# Patient Record
Sex: MALE | URBAN - METROPOLITAN AREA
[De-identification: names, ages, dates, MRNs, and addresses within clinical notes are randomized per-mention and may not be internally consistent; named-entity substitution may affect disease eponyms.]

---

## 2019-09-24 ENCOUNTER — AMBULATORY SURGICAL CENTER (OUTPATIENT)
Dept: URBAN - METROPOLITAN AREA SURGERY 10 | Facility: SURGERY | Age: 64
End: 2019-09-24
Payer: MEDICARE

## 2019-09-24 ENCOUNTER — OFFICE (OUTPATIENT)
Dept: URBAN - METROPOLITAN AREA PATHOLOGY 4 | Facility: PATHOLOGY | Age: 64
End: 2019-09-24
Payer: MEDICARE

## 2019-09-24 DIAGNOSIS — D12.4 BENIGN NEOPLASM OF DESCENDING COLON: ICD-10-CM

## 2019-09-24 DIAGNOSIS — K64.0 FIRST DEGREE HEMORRHOIDS: ICD-10-CM

## 2019-09-24 DIAGNOSIS — K62.1 RECTAL POLYP: ICD-10-CM

## 2019-09-24 DIAGNOSIS — K57.90 DIVERTICULOSIS OF INTESTINE, PART UNSPECIFIED, WITHOUT PERFO: ICD-10-CM

## 2019-09-24 DIAGNOSIS — Z86.010 PERSONAL HISTORY OF COLONIC POLYPS: ICD-10-CM

## 2019-09-24 PROCEDURE — 45385 COLONOSCOPY W/LESION REMOVAL: CPT | Mod: PT | Performed by: INTERNAL MEDICINE

## 2019-09-24 PROCEDURE — 88305 TISSUE EXAM BY PATHOLOGIST: CPT | Performed by: INTERNAL MEDICINE

## 2019-09-25 PROBLEM — Z86.010 PERSONAL HISTORY OF COLON POLYPS: Status: ACTIVE | Noted: 2019-09-25

## 2020-12-30 ENCOUNTER — APPOINTMENT (OUTPATIENT)
Dept: PREADMISSION TESTING | Facility: HOSPITAL | Age: 65
End: 2020-12-30

## 2020-12-30 ENCOUNTER — HOSPITAL ENCOUNTER (OUTPATIENT)
Dept: GENERAL RADIOLOGY | Facility: HOSPITAL | Age: 65
Discharge: HOME | End: 2020-12-30

## 2020-12-30 VITALS — BODY MASS INDEX: 39.57 KG/M2 | HEIGHT: 66 IN | WEIGHT: 246.2 LBS

## 2020-12-30 LAB
ALBUMIN SERPL-MCNC: 3.9 G/DL (ref 3.5–5.2)
ALBUMIN/GLOB SERPL: 1.7 G/DL
ALP SERPL-CCNC: 84 U/L (ref 39–117)
ALT SERPL W P-5'-P-CCNC: 23 U/L (ref 1–41)
ANION GAP SERPL CALCULATED.3IONS-SCNC: 7 MMOL/L (ref 5–15)
APTT PPP: 31.4 SECONDS (ref 24–37)
AST SERPL-CCNC: 18 U/L (ref 1–40)
BASOPHILS # BLD AUTO: 0.03 10*3/MM3 (ref 0–0.2)
BASOPHILS NFR BLD AUTO: 0.5 % (ref 0–1.5)
BILIRUB SERPL-MCNC: 0.3 MG/DL (ref 0–1.2)
BILIRUB UR QL STRIP: NEGATIVE
BUN SERPL-MCNC: 18 MG/DL (ref 8–23)
BUN/CREAT SERPL: 19.1 (ref 7–25)
CALCIUM SPEC-SCNC: 8.7 MG/DL (ref 8.6–10.5)
CHLORIDE SERPL-SCNC: 103 MMOL/L (ref 98–107)
CLARITY UR: CLEAR
CO2 SERPL-SCNC: 29 MMOL/L (ref 22–29)
COLOR UR: YELLOW
CREAT SERPL-MCNC: 0.94 MG/DL (ref 0.76–1.27)
DEPRECATED RDW RBC AUTO: 46 FL (ref 37–54)
EOSINOPHIL # BLD AUTO: 0.08 10*3/MM3 (ref 0–0.4)
EOSINOPHIL NFR BLD AUTO: 1.2 % (ref 0.3–6.2)
ERYTHROCYTE [DISTWIDTH] IN BLOOD BY AUTOMATED COUNT: 12.5 % (ref 12.3–15.4)
GFR SERPL CREATININE-BSD FRML MDRD: 80 ML/MIN/1.73
GFR SERPL CREATININE-BSD FRML MDRD: 97 ML/MIN/1.73
GLOBULIN UR ELPH-MCNC: 2.3 GM/DL
GLUCOSE SERPL-MCNC: 87 MG/DL (ref 65–99)
GLUCOSE UR STRIP-MCNC: NEGATIVE MG/DL
HBA1C MFR BLD: 5.6 % (ref 4.8–5.6)
HCT VFR BLD AUTO: 44.4 % (ref 37.5–51)
HGB BLD-MCNC: 14.5 G/DL (ref 13–17.7)
HGB UR QL STRIP.AUTO: NEGATIVE
IMM GRANULOCYTES # BLD AUTO: 0.02 10*3/MM3 (ref 0–0.05)
IMM GRANULOCYTES NFR BLD AUTO: 0.3 % (ref 0–0.5)
INR PPP: 0.98 (ref 0.85–1.16)
KETONES UR QL STRIP: NEGATIVE
LEUKOCYTE ESTERASE UR QL STRIP.AUTO: NEGATIVE
LYMPHOCYTES # BLD AUTO: 2.08 10*3/MM3 (ref 0.7–3.1)
LYMPHOCYTES NFR BLD AUTO: 32.3 % (ref 19.6–45.3)
MCH RBC QN AUTO: 32.3 PG (ref 26.6–33)
MCHC RBC AUTO-ENTMCNC: 32.7 G/DL (ref 31.5–35.7)
MCV RBC AUTO: 98.9 FL (ref 79–97)
MONOCYTES # BLD AUTO: 0.63 10*3/MM3 (ref 0.1–0.9)
MONOCYTES NFR BLD AUTO: 9.8 % (ref 5–12)
NEUTROPHILS NFR BLD AUTO: 3.6 10*3/MM3 (ref 1.7–7)
NEUTROPHILS NFR BLD AUTO: 55.9 % (ref 42.7–76)
NITRITE UR QL STRIP: NEGATIVE
NRBC BLD AUTO-RTO: 0 /100 WBC (ref 0–0.2)
PH UR STRIP.AUTO: 5.5 [PH] (ref 5–8)
PLATELET # BLD AUTO: 256 10*3/MM3 (ref 140–450)
PMV BLD AUTO: 9.1 FL (ref 6–12)
POTASSIUM SERPL-SCNC: 4.2 MMOL/L (ref 3.5–5.2)
PROT SERPL-MCNC: 6.2 G/DL (ref 6–8.5)
PROT UR QL STRIP: NEGATIVE
PROTHROMBIN TIME: 12.7 SECONDS (ref 11.5–14)
QT INTERVAL: 396 MS
QTC INTERVAL: 398 MS
RBC # BLD AUTO: 4.49 10*6/MM3 (ref 4.14–5.8)
SODIUM SERPL-SCNC: 139 MMOL/L (ref 136–145)
SP GR UR STRIP: 1.02 (ref 1–1.03)
UROBILINOGEN UR QL STRIP: NORMAL
WBC # BLD AUTO: 6.44 10*3/MM3 (ref 3.4–10.8)

## 2020-12-30 PROCEDURE — 85025 COMPLETE CBC W/AUTO DIFF WBC: CPT

## 2020-12-30 PROCEDURE — 85730 THROMBOPLASTIN TIME PARTIAL: CPT

## 2020-12-30 PROCEDURE — 85610 PROTHROMBIN TIME: CPT

## 2020-12-30 PROCEDURE — 81003 URINALYSIS AUTO W/O SCOPE: CPT

## 2020-12-30 PROCEDURE — 80053 COMPREHEN METABOLIC PANEL: CPT

## 2020-12-30 PROCEDURE — 36415 COLL VENOUS BLD VENIPUNCTURE: CPT

## 2020-12-30 PROCEDURE — 71046 X-RAY EXAM CHEST 2 VIEWS: CPT

## 2020-12-30 PROCEDURE — 83036 HEMOGLOBIN GLYCOSYLATED A1C: CPT

## 2020-12-30 PROCEDURE — 93005 ELECTROCARDIOGRAM TRACING: CPT

## 2020-12-30 PROCEDURE — 93010 ELECTROCARDIOGRAM REPORT: CPT | Performed by: INTERNAL MEDICINE

## 2020-12-30 RX ORDER — CHOLECALCIFEROL (VITAMIN D3) 125 MCG
1 CAPSULE ORAL DAILY
COMMUNITY

## 2020-12-30 RX ORDER — OMEPRAZOLE 20 MG/1
20 CAPSULE, DELAYED RELEASE ORAL 2 TIMES DAILY
COMMUNITY

## 2020-12-30 RX ORDER — MULTIPLE VITAMINS W/ MINERALS TAB 9MG-400MCG
1 TAB ORAL DAILY
COMMUNITY

## 2020-12-30 RX ORDER — TAMSULOSIN HYDROCHLORIDE 0.4 MG/1
1 CAPSULE ORAL DAILY
COMMUNITY

## 2020-12-30 RX ORDER — LOSARTAN POTASSIUM 100 MG/1
100 TABLET ORAL DAILY
COMMUNITY

## 2020-12-30 RX ORDER — ACETAMINOPHEN 500 MG
1000 TABLET ORAL 2 TIMES DAILY PRN
COMMUNITY

## 2021-01-05 ENCOUNTER — APPOINTMENT (OUTPATIENT)
Dept: PREADMISSION TESTING | Facility: HOSPITAL | Age: 66
End: 2021-01-05

## 2021-01-06 ENCOUNTER — ANESTHESIA EVENT (OUTPATIENT)
Dept: PERIOP | Facility: HOSPITAL | Age: 66
End: 2021-01-06

## 2021-01-06 RX ORDER — FAMOTIDINE 10 MG/ML
20 INJECTION, SOLUTION INTRAVENOUS ONCE
Status: CANCELLED | OUTPATIENT
Start: 2021-01-06 | End: 2021-01-06

## 2021-01-07 ENCOUNTER — APPOINTMENT (OUTPATIENT)
Dept: GENERAL RADIOLOGY | Facility: HOSPITAL | Age: 66
End: 2021-01-07

## 2021-01-07 ENCOUNTER — ANESTHESIA EVENT CONVERTED (OUTPATIENT)
Dept: ANESTHESIOLOGY | Facility: HOSPITAL | Age: 66
End: 2021-01-07

## 2021-01-07 ENCOUNTER — ANESTHESIA (OUTPATIENT)
Dept: PERIOP | Facility: HOSPITAL | Age: 66
End: 2021-01-07

## 2021-01-07 ENCOUNTER — HOSPITAL ENCOUNTER (INPATIENT)
Facility: HOSPITAL | Age: 66
LOS: 1 days | Discharge: HOME | End: 2021-01-08
Attending: ORTHOPAEDIC SURGERY | Admitting: ORTHOPAEDIC SURGERY

## 2021-01-07 DIAGNOSIS — Z96.612 STATUS POST REVERSE TOTAL ARTHROPLASTY OF LEFT SHOULDER: Primary | ICD-10-CM

## 2021-01-07 PROBLEM — E66.9 OBESITY (BMI 30-39.9): Status: ACTIVE | Noted: 2021-01-07

## 2021-01-07 PROBLEM — G47.33 OBSTRUCTIVE SLEEP APNEA: Status: ACTIVE | Noted: 2021-01-07

## 2021-01-07 PROBLEM — I10 ESSENTIAL HYPERTENSION: Status: ACTIVE | Noted: 2021-01-07

## 2021-01-07 PROBLEM — M12.812 ROTATOR CUFF ARTHROPATHY OF LEFT SHOULDER: Status: ACTIVE | Noted: 2021-01-07

## 2021-01-07 PROCEDURE — 25010000002 VANCOMYCIN 1 G RECONSTITUTED SOLUTION: Performed by: ORTHOPAEDIC SURGERY

## 2021-01-07 PROCEDURE — C1776 JOINT DEVICE (IMPLANTABLE): HCPCS | Performed by: ORTHOPAEDIC SURGERY

## 2021-01-07 PROCEDURE — 25010000002 PROPOFOL 10 MG/ML EMULSION: Performed by: NURSE ANESTHETIST, CERTIFIED REGISTERED

## 2021-01-07 PROCEDURE — 97535 SELF CARE MNGMENT TRAINING: CPT

## 2021-01-07 PROCEDURE — 25010000002 CEFAZOLIN PER 500 MG: Performed by: ORTHOPAEDIC SURGERY

## 2021-01-07 PROCEDURE — 25010000002 DEXAMETHASONE PER 1 MG: Performed by: NURSE ANESTHETIST, CERTIFIED REGISTERED

## 2021-01-07 PROCEDURE — 25010000003 CEFAZOLIN IN DEXTROSE 2-4 GM/100ML-% SOLUTION: Performed by: ORTHOPAEDIC SURGERY

## 2021-01-07 PROCEDURE — 97165 OT EVAL LOW COMPLEX 30 MIN: CPT

## 2021-01-07 PROCEDURE — 94660 CPAP INITIATION&MGMT: CPT

## 2021-01-07 PROCEDURE — 0RRK00Z REPLACEMENT OF LEFT SHOULDER JOINT WITH REVERSE BALL AND SOCKET SYNTHETIC SUBSTITUTE, OPEN APPROACH: ICD-10-PCS | Performed by: ORTHOPAEDIC SURGERY

## 2021-01-07 PROCEDURE — 25010000002 ONDANSETRON PER 1 MG: Performed by: NURSE ANESTHETIST, CERTIFIED REGISTERED

## 2021-01-07 PROCEDURE — 25010000002 FENTANYL CITRATE (PF) 100 MCG/2ML SOLUTION: Performed by: NURSE ANESTHETIST, CERTIFIED REGISTERED

## 2021-01-07 PROCEDURE — 25010000002 NEOSTIGMINE 10 MG/10ML SOLUTION: Performed by: NURSE ANESTHETIST, CERTIFIED REGISTERED

## 2021-01-07 PROCEDURE — 73020 X-RAY EXAM OF SHOULDER: CPT

## 2021-01-07 PROCEDURE — L3670 SO ACRO/CLAV CAN WEB PRE OTS: HCPCS | Performed by: ORTHOPAEDIC SURGERY

## 2021-01-07 PROCEDURE — 25010000002 DEXAMETHASONE SODIUM PHOSPHATE 10 MG/ML SOLUTION: Performed by: ANESTHESIOLOGY

## 2021-01-07 PROCEDURE — 94799 UNLISTED PULMONARY SVC/PX: CPT

## 2021-01-07 PROCEDURE — 97110 THERAPEUTIC EXERCISES: CPT

## 2021-01-07 PROCEDURE — 76942 ECHO GUIDE FOR BIOPSY: CPT | Performed by: ORTHOPAEDIC SURGERY

## 2021-01-07 PROCEDURE — 25010000002 ROPIVACAINE PER 1 MG: Performed by: ORTHOPAEDIC SURGERY

## 2021-01-07 DEVICE — 5.5X24MM PERIPHERAL SCREW, LOCKING
Type: IMPLANTABLE DEVICE | Site: SHOULDER | Status: FUNCTIONAL
Brand: ARTHREX®

## 2021-01-07 DEVICE — SUT FW #2 W/TPR NDL 1/2 CIR 38IN 97CM 26.5MM BLU: Type: IMPLANTABLE DEVICE | Site: SHOULDER | Status: FUNCTIONAL

## 2021-01-07 DEVICE — HUMERAL INSERT M/39 +6 TO FIT IN 39 CUP
Type: IMPLANTABLE DEVICE | Site: SHOULDER | Status: FUNCTIONAL
Brand: ARTHREX®

## 2021-01-07 DEVICE — 20MM CENTRAL SCREW, MODULAR
Type: IMPLANTABLE DEVICE | Site: SHOULDER | Status: FUNCTIONAL
Brand: ARTHREX®

## 2021-01-07 DEVICE — IMPLANTABLE DEVICE: Type: IMPLANTABLE DEVICE | Site: SHOULDER | Status: FUNCTIONAL

## 2021-01-07 DEVICE — 4.5X32MM PERIPHERAL SCREW, NON-LOCKING
Type: IMPLANTABLE DEVICE | Site: SHOULDER | Status: FUNCTIONAL
Brand: ARTHREX®

## 2021-01-07 DEVICE — 24MM +2 LAT BASEPLATE, MODULAR
Type: IMPLANTABLE DEVICE | Site: SHOULDER | Status: FUNCTIONAL
Brand: ARTHREX®

## 2021-01-07 DEVICE — ABSORBABLE HEMOSTAT (OXIDIZED REGENERATED CELLULOSE, U.S.P.)
Type: IMPLANTABLE DEVICE | Site: SHOULDER | Status: FUNCTIONAL
Brand: SURGICEL

## 2021-01-07 DEVICE — 5.5X32MM PERIPHERAL SCREW, LOCKING
Type: IMPLANTABLE DEVICE | Site: SHOULDER | Status: FUNCTIONAL
Brand: ARTHREX®

## 2021-01-07 DEVICE — UNIVERS REVERS APEX STEM, SIZE 9
Type: IMPLANTABLE DEVICE | Site: SHOULDER | Status: FUNCTIONAL
Brand: ARTHREX®

## 2021-01-07 DEVICE — UNIVERS REVERS SUTURE CUP, 39 (+2 LEFT)
Type: IMPLANTABLE DEVICE | Site: SHOULDER | Status: FUNCTIONAL
Brand: ARTHREX®

## 2021-01-07 DEVICE — 39 +4 LAT/24 GLENOSPHERE
Type: IMPLANTABLE DEVICE | Site: SHOULDER | Status: FUNCTIONAL
Brand: ARTHREX®

## 2021-01-07 DEVICE — 4.5X36MM PERIPHERAL SCREW, NON-LOCKING
Type: IMPLANTABLE DEVICE | Site: SHOULDER | Status: FUNCTIONAL
Brand: ARTHREX®

## 2021-01-07 RX ORDER — NEOSTIGMINE METHYLSULFATE 1 MG/ML
INJECTION, SOLUTION INTRAVENOUS AS NEEDED
Status: DISCONTINUED | OUTPATIENT
Start: 2021-01-07 | End: 2021-01-07 | Stop reason: SURG

## 2021-01-07 RX ORDER — HYDROMORPHONE HYDROCHLORIDE 1 MG/ML
0.5 INJECTION, SOLUTION INTRAMUSCULAR; INTRAVENOUS; SUBCUTANEOUS
Status: DISCONTINUED | OUTPATIENT
Start: 2021-01-07 | End: 2021-01-08 | Stop reason: HOSPADM

## 2021-01-07 RX ORDER — PREGABALIN 75 MG/1
75 CAPSULE ORAL ONCE
Status: COMPLETED | OUTPATIENT
Start: 2021-01-07 | End: 2021-01-07

## 2021-01-07 RX ORDER — SODIUM CHLORIDE 0.9 % (FLUSH) 0.9 %
10 SYRINGE (ML) INJECTION EVERY 12 HOURS SCHEDULED
Status: DISCONTINUED | OUTPATIENT
Start: 2021-01-07 | End: 2021-01-07 | Stop reason: HOSPADM

## 2021-01-07 RX ORDER — PANTOPRAZOLE SODIUM 40 MG/1
40 TABLET, DELAYED RELEASE ORAL
Status: DISCONTINUED | OUTPATIENT
Start: 2021-01-08 | End: 2021-01-08 | Stop reason: HOSPADM

## 2021-01-07 RX ORDER — DEXAMETHASONE SODIUM PHOSPHATE 10 MG/ML
INJECTION, SOLUTION INTRAMUSCULAR; INTRAVENOUS
Status: COMPLETED | OUTPATIENT
Start: 2021-01-07 | End: 2021-01-07

## 2021-01-07 RX ORDER — GLYCOPYRROLATE 0.2 MG/ML
INJECTION INTRAMUSCULAR; INTRAVENOUS AS NEEDED
Status: DISCONTINUED | OUTPATIENT
Start: 2021-01-07 | End: 2021-01-07 | Stop reason: SURG

## 2021-01-07 RX ORDER — ACETAMINOPHEN 650 MG
TABLET, EXTENDED RELEASE ORAL AS NEEDED
Status: DISCONTINUED | OUTPATIENT
Start: 2021-01-07 | End: 2021-01-07 | Stop reason: HOSPADM

## 2021-01-07 RX ORDER — ONDANSETRON 2 MG/ML
4 INJECTION INTRAMUSCULAR; INTRAVENOUS EVERY 6 HOURS PRN
Status: DISCONTINUED | OUTPATIENT
Start: 2021-01-07 | End: 2021-01-08 | Stop reason: HOSPADM

## 2021-01-07 RX ORDER — ONDANSETRON 2 MG/ML
4 INJECTION INTRAMUSCULAR; INTRAVENOUS ONCE AS NEEDED
Status: DISCONTINUED | OUTPATIENT
Start: 2021-01-07 | End: 2021-01-07 | Stop reason: HOSPADM

## 2021-01-07 RX ORDER — SODIUM CHLORIDE 0.9 % (FLUSH) 0.9 %
10 SYRINGE (ML) INJECTION AS NEEDED
Status: DISCONTINUED | OUTPATIENT
Start: 2021-01-07 | End: 2021-01-07 | Stop reason: HOSPADM

## 2021-01-07 RX ORDER — VANCOMYCIN HYDROCHLORIDE 1 G/20ML
INJECTION, POWDER, LYOPHILIZED, FOR SOLUTION INTRAVENOUS AS NEEDED
Status: DISCONTINUED | OUTPATIENT
Start: 2021-01-07 | End: 2021-01-07 | Stop reason: HOSPADM

## 2021-01-07 RX ORDER — FENTANYL CITRATE 50 UG/ML
50 INJECTION, SOLUTION INTRAMUSCULAR; INTRAVENOUS
Status: DISCONTINUED | OUTPATIENT
Start: 2021-01-07 | End: 2021-01-07 | Stop reason: HOSPADM

## 2021-01-07 RX ORDER — TAMSULOSIN HYDROCHLORIDE 0.4 MG/1
0.4 CAPSULE ORAL NIGHTLY
Status: DISCONTINUED | OUTPATIENT
Start: 2021-01-07 | End: 2021-01-08 | Stop reason: HOSPADM

## 2021-01-07 RX ORDER — ACETAMINOPHEN 500 MG
1000 TABLET ORAL ONCE
Status: COMPLETED | OUTPATIENT
Start: 2021-01-07 | End: 2021-01-07

## 2021-01-07 RX ORDER — EPHEDRINE SULFATE 50 MG/ML
5 INJECTION, SOLUTION INTRAVENOUS ONCE AS NEEDED
Status: DISCONTINUED | OUTPATIENT
Start: 2021-01-07 | End: 2021-01-07 | Stop reason: HOSPADM

## 2021-01-07 RX ORDER — BUPIVACAINE HYDROCHLORIDE 2.5 MG/ML
INJECTION, SOLUTION EPIDURAL; INFILTRATION; INTRACAUDAL
Status: COMPLETED | OUTPATIENT
Start: 2021-01-07 | End: 2021-01-07

## 2021-01-07 RX ORDER — ONDANSETRON 4 MG/1
4 TABLET, FILM COATED ORAL EVERY 6 HOURS PRN
Status: DISCONTINUED | OUTPATIENT
Start: 2021-01-07 | End: 2021-01-08 | Stop reason: HOSPADM

## 2021-01-07 RX ORDER — LIDOCAINE HYDROCHLORIDE 10 MG/ML
0.5 INJECTION, SOLUTION EPIDURAL; INFILTRATION; INTRACAUDAL; PERINEURAL ONCE AS NEEDED
Status: COMPLETED | OUTPATIENT
Start: 2021-01-07 | End: 2021-01-07

## 2021-01-07 RX ORDER — SODIUM CHLORIDE, SODIUM LACTATE, POTASSIUM CHLORIDE, CALCIUM CHLORIDE 600; 310; 30; 20 MG/100ML; MG/100ML; MG/100ML; MG/100ML
100 INJECTION, SOLUTION INTRAVENOUS CONTINUOUS
Status: DISCONTINUED | OUTPATIENT
Start: 2021-01-07 | End: 2021-01-08 | Stop reason: HOSPADM

## 2021-01-07 RX ORDER — FAMOTIDINE 20 MG/1
20 TABLET, FILM COATED ORAL ONCE
Status: COMPLETED | OUTPATIENT
Start: 2021-01-07 | End: 2021-01-07

## 2021-01-07 RX ORDER — HYDROMORPHONE HYDROCHLORIDE 1 MG/ML
0.5 INJECTION, SOLUTION INTRAMUSCULAR; INTRAVENOUS; SUBCUTANEOUS
Status: DISCONTINUED | OUTPATIENT
Start: 2021-01-07 | End: 2021-01-07 | Stop reason: HOSPADM

## 2021-01-07 RX ORDER — LABETALOL HYDROCHLORIDE 5 MG/ML
10 INJECTION, SOLUTION INTRAVENOUS EVERY 4 HOURS PRN
Status: DISCONTINUED | OUTPATIENT
Start: 2021-01-07 | End: 2021-01-08 | Stop reason: HOSPADM

## 2021-01-07 RX ORDER — ACETAMINOPHEN 650 MG/1
650 SUPPOSITORY RECTAL EVERY 4 HOURS PRN
Status: DISCONTINUED | OUTPATIENT
Start: 2021-01-07 | End: 2021-01-08 | Stop reason: HOSPADM

## 2021-01-07 RX ORDER — MAGNESIUM HYDROXIDE 1200 MG/15ML
LIQUID ORAL AS NEEDED
Status: DISCONTINUED | OUTPATIENT
Start: 2021-01-07 | End: 2021-01-07 | Stop reason: HOSPADM

## 2021-01-07 RX ORDER — LIDOCAINE HYDROCHLORIDE 10 MG/ML
INJECTION, SOLUTION EPIDURAL; INFILTRATION; INTRACAUDAL; PERINEURAL AS NEEDED
Status: DISCONTINUED | OUTPATIENT
Start: 2021-01-07 | End: 2021-01-07 | Stop reason: SURG

## 2021-01-07 RX ORDER — OXYCODONE HYDROCHLORIDE 5 MG/1
10 TABLET ORAL EVERY 4 HOURS PRN
Status: DISCONTINUED | OUTPATIENT
Start: 2021-01-07 | End: 2021-01-08 | Stop reason: HOSPADM

## 2021-01-07 RX ORDER — SODIUM CHLORIDE, SODIUM LACTATE, POTASSIUM CHLORIDE, CALCIUM CHLORIDE 600; 310; 30; 20 MG/100ML; MG/100ML; MG/100ML; MG/100ML
9 INJECTION, SOLUTION INTRAVENOUS CONTINUOUS
Status: DISCONTINUED | OUTPATIENT
Start: 2021-01-07 | End: 2021-01-07

## 2021-01-07 RX ORDER — NALOXONE HCL 0.4 MG/ML
0.1 VIAL (ML) INJECTION
Status: DISCONTINUED | OUTPATIENT
Start: 2021-01-07 | End: 2021-01-08 | Stop reason: HOSPADM

## 2021-01-07 RX ORDER — CEFAZOLIN SODIUM 2 G/100ML
2 INJECTION, SOLUTION INTRAVENOUS EVERY 8 HOURS
Status: COMPLETED | OUTPATIENT
Start: 2021-01-07 | End: 2021-01-08

## 2021-01-07 RX ORDER — ONDANSETRON 2 MG/ML
INJECTION INTRAMUSCULAR; INTRAVENOUS AS NEEDED
Status: DISCONTINUED | OUTPATIENT
Start: 2021-01-07 | End: 2021-01-07 | Stop reason: SURG

## 2021-01-07 RX ORDER — SODIUM CHLORIDE 450 MG/100ML
50 INJECTION, SOLUTION INTRAVENOUS CONTINUOUS
Status: DISCONTINUED | OUTPATIENT
Start: 2021-01-07 | End: 2021-01-08 | Stop reason: HOSPADM

## 2021-01-07 RX ORDER — ROCURONIUM BROMIDE 10 MG/ML
INJECTION, SOLUTION INTRAVENOUS AS NEEDED
Status: DISCONTINUED | OUTPATIENT
Start: 2021-01-07 | End: 2021-01-07 | Stop reason: SURG

## 2021-01-07 RX ORDER — FENTANYL CITRATE 50 UG/ML
INJECTION, SOLUTION INTRAMUSCULAR; INTRAVENOUS
Status: COMPLETED | OUTPATIENT
Start: 2021-01-07 | End: 2021-01-07

## 2021-01-07 RX ORDER — OXYCODONE HYDROCHLORIDE 5 MG/1
5 TABLET ORAL EVERY 4 HOURS PRN
Status: DISCONTINUED | OUTPATIENT
Start: 2021-01-07 | End: 2021-01-08 | Stop reason: HOSPADM

## 2021-01-07 RX ORDER — CEFAZOLIN SODIUM 2 G/100ML
2 INJECTION, SOLUTION INTRAVENOUS ONCE
Status: COMPLETED | OUTPATIENT
Start: 2021-01-07 | End: 2021-01-07

## 2021-01-07 RX ORDER — PROPOFOL 10 MG/ML
VIAL (ML) INTRAVENOUS AS NEEDED
Status: DISCONTINUED | OUTPATIENT
Start: 2021-01-07 | End: 2021-01-07 | Stop reason: SURG

## 2021-01-07 RX ORDER — NALOXONE HCL 0.4 MG/ML
0.4 VIAL (ML) INJECTION AS NEEDED
Status: DISCONTINUED | OUTPATIENT
Start: 2021-01-07 | End: 2021-01-07 | Stop reason: HOSPADM

## 2021-01-07 RX ORDER — ACETAMINOPHEN 325 MG/1
650 TABLET ORAL EVERY 4 HOURS PRN
Status: DISCONTINUED | OUTPATIENT
Start: 2021-01-07 | End: 2021-01-08 | Stop reason: HOSPADM

## 2021-01-07 RX ORDER — DEXAMETHASONE SODIUM PHOSPHATE 4 MG/ML
INJECTION, SOLUTION INTRA-ARTICULAR; INTRALESIONAL; INTRAMUSCULAR; INTRAVENOUS; SOFT TISSUE AS NEEDED
Status: DISCONTINUED | OUTPATIENT
Start: 2021-01-07 | End: 2021-01-07 | Stop reason: SURG

## 2021-01-07 RX ADMIN — LIDOCAINE HYDROCHLORIDE 0.2 ML: 10 INJECTION, SOLUTION EPIDURAL; INFILTRATION; INTRACAUDAL; PERINEURAL at 06:06

## 2021-01-07 RX ADMIN — TAMSULOSIN HYDROCHLORIDE 0.4 MG: 0.4 CAPSULE ORAL at 20:01

## 2021-01-07 RX ADMIN — OXYCODONE 5 MG: 5 TABLET ORAL at 12:17

## 2021-01-07 RX ADMIN — Medication 1000 MG: at 07:39

## 2021-01-07 RX ADMIN — SODIUM CHLORIDE, POTASSIUM CHLORIDE, SODIUM LACTATE AND CALCIUM CHLORIDE: 600; 310; 30; 20 INJECTION, SOLUTION INTRAVENOUS at 09:06

## 2021-01-07 RX ADMIN — EPHEDRINE SULFATE 10 MG: 50 INJECTION INTRAMUSCULAR; INTRAVENOUS; SUBCUTANEOUS at 07:54

## 2021-01-07 RX ADMIN — ONDANSETRON 4 MG: 2 INJECTION INTRAMUSCULAR; INTRAVENOUS at 09:10

## 2021-01-07 RX ADMIN — BUPIVACAINE HYDROCHLORIDE 15 ML: 2.5 INJECTION, SOLUTION EPIDURAL; INFILTRATION; INTRACAUDAL; PERINEURAL at 07:11

## 2021-01-07 RX ADMIN — ACETAMINOPHEN 1000 MG: 500 TABLET ORAL at 06:20

## 2021-01-07 RX ADMIN — CEFAZOLIN SODIUM 2 G: 2 INJECTION, SOLUTION INTRAVENOUS at 07:30

## 2021-01-07 RX ADMIN — ROCURONIUM BROMIDE 50 MG: 10 INJECTION INTRAVENOUS at 07:34

## 2021-01-07 RX ADMIN — CEFAZOLIN 2 G: 10 INJECTION, POWDER, FOR SOLUTION INTRAVENOUS at 23:30

## 2021-01-07 RX ADMIN — CEFAZOLIN 2 G: 10 INJECTION, POWDER, FOR SOLUTION INTRAVENOUS at 15:23

## 2021-01-07 RX ADMIN — BUPIVACAINE HYDROCHLORIDE 30 ML: 2.5 INJECTION, SOLUTION EPIDURAL; INFILTRATION; INTRACAUDAL; PERINEURAL at 07:35

## 2021-01-07 RX ADMIN — PREGABALIN 75 MG: 75 CAPSULE ORAL at 06:20

## 2021-01-07 RX ADMIN — EPHEDRINE SULFATE 10 MG: 50 INJECTION INTRAMUSCULAR; INTRAVENOUS; SUBCUTANEOUS at 08:29

## 2021-01-07 RX ADMIN — SODIUM CHLORIDE 50 ML/HR: 4.5 INJECTION, SOLUTION INTRAVENOUS at 12:17

## 2021-01-07 RX ADMIN — FAMOTIDINE 20 MG: 20 TABLET, FILM COATED ORAL at 06:20

## 2021-01-07 RX ADMIN — PROPOFOL 25 MCG/KG/MIN: 10 INJECTION, EMULSION INTRAVENOUS at 07:39

## 2021-01-07 RX ADMIN — SODIUM CHLORIDE, POTASSIUM CHLORIDE, SODIUM LACTATE AND CALCIUM CHLORIDE 9 ML/HR: 600; 310; 30; 20 INJECTION, SOLUTION INTRAVENOUS at 06:06

## 2021-01-07 RX ADMIN — NEOSTIGMINE 3 MG: 1 INJECTION INTRAVENOUS at 09:10

## 2021-01-07 RX ADMIN — LIDOCAINE HYDROCHLORIDE 50 MG: 10 INJECTION, SOLUTION EPIDURAL; INFILTRATION; INTRACAUDAL; PERINEURAL at 07:34

## 2021-01-07 RX ADMIN — DEXAMETHASONE SODIUM PHOSPHATE 2 MG: 10 INJECTION INTRAMUSCULAR; INTRAVENOUS at 07:35

## 2021-01-07 RX ADMIN — GLYCOPYRROLATE 0.4 MG: 0.2 INJECTION INTRAMUSCULAR; INTRAVENOUS at 09:10

## 2021-01-07 RX ADMIN — FENTANYL CITRATE 100 MCG: 50 INJECTION, SOLUTION INTRAMUSCULAR; INTRAVENOUS at 07:11

## 2021-01-07 RX ADMIN — PROPOFOL 200 MG: 10 INJECTION, EMULSION INTRAVENOUS at 07:34

## 2021-01-07 RX ADMIN — Medication 1000 MG: at 08:53

## 2021-01-07 RX ADMIN — DEXAMETHASONE SODIUM PHOSPHATE 8 MG: 4 INJECTION, SOLUTION INTRAMUSCULAR; INTRAVENOUS at 07:34

## 2021-01-07 NOTE — OP NOTE
Operative Report Reverse Total Shoulder Arthroplasty    DATE OF OPERATION: 01/07/21    PREOPERATIVE DIAGNOSIS: left shoulder massive irreparable rotator cuff tear      POSTOPERATIVE DIAGNOSES:  same      PROCEDURES PERFORMED:  1. left reverse total shoulder arthroplasty.         SURGEON: Franco Senior MD    ASSISTANTS:  1. Elizabeth Duran PA-C    ** Please note the physician assistant was medically necessary to assist with positioning retraction, arm positioning, care of soft tissues and closure      ANESTHESIA: General plus block.      ESTIMATED BLOOD LOSS:150mL.      COMPLICATIONS: None.      DISPOSITION: Recovery room in stable condition.      IMPLANTS:  Arthrex reverse total shoulder system  Humeral Stem: size 9  Humeral Tray: offset 39 at 135 degrees  Polyethylene Liner: 39+6 std  Baseplate: 24 MGS +2, 20mm central post screw  Glenosphere: 39+4     INDICATIONS: This is a 65-year-old male with left shoulder pain and limited function and motion secondary to above diagnosis. They have failed conservative treatment and after a discussion of risks, benefits, and alternatives, wished to proceed with shoulder arthroplasty.    DESCRIPTION OF PROCEDURE: On the day of surgery, the patient identified the left shoulder as the correct operative extremity. This was initialed by the surgeon with the patients's acknowledgment. The patient underwent placement of an interscalene block and was taken to the operating room and placed in the supine position. Upon induction of adequate anesthesia, the patient was brought up to the beach chair position and the shoulder and upper extremity were prepped and draped in the usual sterile fashion. Timeout confirmed the correct patient and operative extremity as well as that antibiotics were on board. A standard deltopectoral approach to the shoulder was carried out. It was carried sharply through the skin and subcutaneous tissue. Medial and lateral flaps were developed over the  deltopectoral fascia. The cephalic vein was identified and mobilized laterally with the deltoid. The subdeltoid and subpectoral spaces were mobilized and a blunt retractor was placed deep to this. The clavipectoral fascia was opened on the lateral edge of the conjoined tendon and the retractor was moved deep to this. The leading edge of the pectoralis was released exposing the long head of the biceps. This was tenosynovitic and therefore tenotomized. The 3 sisters were identified and coagulated. A subscapularis tenotomy was performed and rotator interval was released to the glenoid exposing the humeral head. The inferior capsule was released directly off the humerus to allow greater than 90° of external rotation. The anatomic neck was exposed and the humeral head osteotomy was performed in approximately 20° of retroversion. The remainder of the osteophytes were removed. The canal was then entered, reamed, and broached. The final stem impacted in in approximately 20° of retroversion. A head protector was placed. The humerus was subluxed posteriorly. The glenoid exposed. Circumferential labral excision and capsular release were performed. A  mobilization of the subscapularis was carried out as well.  A centering hole was drilled. The glenoid was gently reamed and then the  central hole for the baseplate was drilled  glenoid baseplate inserted.  Screws were then placed through the baseplate  The glenosphere was then inserted and locked into place with a set screw.  The humerus was carefully subluxed back anteriorly. A liner tray and polyethylene were placed and trialing was carried out. The appropriate final sizes were chosen and locked into place.  The shoulder was then reduced.  This allowed nearly full passive range of motion with no instability. The joint was copiously irrigated with orthopedic irrigation mixed with Betadine after the final implants were assembled and locked into place.     vancomycin powder was  placed in the wound      The deltopectoral interval was approximated with 0 Vicryl, the subcutaneous tissue with 2-0 Vicryl, and the skin with staples. A sterile dressing was placed. Anesthesia was reversed and the patient was taken to the recovery room in stable condition. All instrument, needle, and sponge counts were correct.      Franco Senior MD, MS

## 2021-01-07 NOTE — ANESTHESIA PROCEDURE NOTES
Peripheral Block      Patient reassessed immediately prior to procedure    Patient location during procedure: OR  Reason for block: at surgeon's request and post-op pain management  Performed by  CRNA: Lobito Adams CRNA  Preanesthetic Checklist  Completed: patient identified, site marked, surgical consent, pre-op evaluation, timeout performed, IV checked, risks and benefits discussed and monitors and equipment checked  Prep:  Pt Position: supine  Sterile barriers:cap, gloves, gown and mask  Prep: ChloraPrep  Patient monitoring: blood pressure monitoring, continuous pulse oximetry and EKG  Procedure  Performed under: general  Guidance:ultrasound guided and landmark technique  Images:still images obtained, printed/placed on chart    Laterality:left  Block Type:PECS I and PECS II  Injection Technique:single-shot  Needle Type:short-bevel  Needle Gauge:20 G  Resistance on Injection: none    Medications Used: dexamethasone sodium phosphate injection, 2 mg  bupivacaine PF (MARCAINE) 0.25 % injection, 30 mL  Med admintered at 1/7/2021 7:35 AM      Medications  Preservative Free Saline:10ml  Comment:Block Injection:  Total volume of LA divided between Right and Left sided blocks         Post Assessment  Injection Assessment: negative aspiration for heme and incremental injection  Patient Tolerance:comfortable throughout block  Complications:no  Additional Notes  The pt. Was placed in the Supine Position and GA was induced     The insertion site was prepped with CHG and Ultrasound guidance with In-Plane techniquewas  a 4inch BBraun 360 degree echogenic needle was visualized.  Normal Saline PSF was  utilized for hydrodissection of tissue. PECS 1 Block- Pectoralis Major and Minor where identified and LA was injected between PMM and PmM at the level of the 3rd Rib(10ml),  PECS 2-  Pectoralis Minor and Serratus muscle where identified and the needle was advanced laterally in-plane with the 4th rib as a backstop, pleura was  monitored.  LA was injected between SA and PmM at the level of 4th rib( 20ml).  LA injection spread was visualized, injection was incremental 1-5ml, normal or low injection pressure, no intravascular injection, no pneumothorax appreciated.  Thank You.

## 2021-01-07 NOTE — THERAPY EVALUATION
Patient Name: Stanley Bethea  : 1955    MRN: [id number]                              Today's Date: 2021       Admit Date: 2021    Visit Dx: No diagnosis found.  Patient Active Problem List   Diagnosis   • Rotator cuff arthropathy of left shoulder   • Obstructive sleep apnea   • Obesity (BMI 30-39.9)   • Essential hypertension   • Status post reverse total arthroplasty of left shoulder     Past Medical History:   Diagnosis Date   • Arthritis    • Heart murmur     from childhood   • History of head injury     6 years ago   • Kidney stone    • Sleep apnea      Past Surgical History:   Procedure Laterality Date   • COLONOSCOPY     • ENDOSCOPY     • GASTRECTOMY     • HERNIA REPAIR      inguinal, abdominal hernia   • JOINT REPLACEMENT      total hip on right side   • NASAL SINUS SURGERY     • ROTATOR CUFF REPAIR Bilateral    • SHOULDER ARTHROSCOPY       General Information     Row Name 21 1532          OT Time and Intention    Document Type  evaluation  -HK     Mode of Treatment  occupational therapy  -HK     Row Name 21 1532          General Information    Patient Profile Reviewed  yes  -HK     Prior Level of Function  min assist:;all household mobility;community mobility;gait;transfer;bed mobility;ADL's  -HK     Existing Precautions/Restrictions  fall;non-weight bearing;left;shoulder;other (see comments) NWB L UE; L UE in sling: L interscalene nerve catheter  -HK     Barriers to Rehab  none identified  -     Row Name 21 1532          Living Environment    Lives With  spouse  -HK     Row Name 21 1532          Home Main Entrance    Number of Stairs, Main Entrance  three  -HK     Stair Railings, Main Entrance  railings on both sides of stairs  -HK     Row Name 21 1532          Stairs Within Home, Primary    Number of Stairs, Within Home, Primary  none  -HK     Stair Railings, Within Home, Primary  none  -HK     Stairs Comment, Within Home, Primary  pt has tub shower. Educated on  completion of safe tub transfers.  -     Row Name 01/07/21 1532          Cognition    Orientation Status (Cognition)  oriented x 4  -HK     Row Name 01/07/21 1532          Safety Issues, Functional Mobility    Safety Issues Affecting Function (Mobility)  safety precautions follow-through/compliance;safety precaution awareness  -     Impairments Affecting Function (Mobility)  pain;strength;range of motion (ROM)  -       User Key  (r) = Recorded By, (t) = Taken By, (c) = Cosigned By    Initials Name Provider Type    HK Consuelo Whyte OT Occupational Therapist          Mobility/ADL's     Row Name 01/07/21 1533          Bed Mobility    Bed Mobility  scooting/bridging;supine-sit  -HK     Scooting/Bridging [place] (Bed Mobility)  supervision;verbal cues  -     Supine-Sit [place] (Bed Mobility)  minimum assist (75% patient effort);verbal cues  -     Bed Mobility, Safety Issues  decreased use of arms for pushing/pulling  -     Assistive Device (Bed Mobility)  head of bed elevated;bed rails  -     Comment (Bed Mobility)  Pt educated on safe completion of bed mobility while maintaining shoulder precautions.  -     Row Name 01/07/21 1533          Transfers    Transfers  sit-stand transfer  -     Sit-Stand [place] (Transfers)  contact guard  -     Row Name 01/07/21 1533          Sit-Stand Transfer    Assistive Device (Sit-Stand Transfers)  other (see comments) gait belt  -     Row Name 01/07/21 1533          Functional Mobility    Functional Mobility- Ind. Level  verbal cues required;contact guard assist  -     Functional Mobility- Device  -- gait belt  -     Functional Mobility-Distance (Feet)  300  -HK     Functional Mobility-Maintain WBing  able to maintain weight bearing status  -HK     Functional Mobility- Comment  Pt ambulated 300 feet with CGA and no AD. Pt with no noted LOB and O2 saturation >87 throughout. Saturations improved with cues for PLB. Pt passed mobility screen with  score of 22 and OT to d/c PT order.  -     Row Name 01/07/21 1533          Activities of Daily Living    BADL Assessment/Intervention  bathing;upper body dressing  -     Row Name 01/07/21 1533          Mobility    Extremity Weight-bearing Status  left upper extremity  -HK     Left Upper Extremity (Weight-bearing Status)  (S) non weight-bearing (NWB)  -     Row Name 01/07/21 1533          Bathing Assessment/Intervention    Comment (Bathing)  Pt educated on completion of L axilla care while maintaining shoulder precautions.  -     Row Name 01/07/21 1533          Upper Body Dressing Assessment/Training    [place] Level (Upper Body Dressing)  maximum assist (25% patient effort);other (see comments) sling  -HK     Position (Upper Body Dressing)  unsupported sitting  -HK     Comment (Upper Body Dressing)  Pt and wife educated on sling management as well as wear and care, shoulder precautions, axilla care, morteza dressing, and care of nerve catheter during ADLS. Wife to complete teach back on sling management tomorrow. Pt maxA at this time.  -       User Key  (r) = Recorded By, (t) = Taken By, (c) = Cosigned By    Initials Name Provider Type     Consuelo Whyte, OT Occupational Therapist        Obj/Interventions     Row Name 01/07/21 1538          Sensory Assessment (Somatosensory)    Sensory Assessment (Somatosensory)  left UE  -HK     Left UE Sensory Assessment  general sensation  -     Sensory Subjective Reports  insensate  -     Row Name 01/07/21 1538          Vision Assessment/Intervention    Visual Impairment/Limitations  WFL  -     Row Name 01/07/21 1538          Range of Motion Comprehensive    General Range of Motion  no range of motion deficits identified  -     Comment, General Range of Motion  R UE WFL ; L UE in sling  -     Row Name 01/07/21 1538          Strength Comprehensive (MMT)    Comment, General Manual Muscle Testing (MMT) Assessment  R UE WFL; L UE in sling  -     Row Name  01/07/21 1538          Elbow/Forearm (Therapeutic Exercise)    Elbow/Forearm (Therapeutic Exercise)  AAROM (active assistive range of motion)  -     Elbow/Forearm AAROM (Therapeutic Exercise)  left;flexion;extension;supination;pronation;10 repetitions;sitting  -     Row Name 01/07/21 1538          Wrist (Therapeutic Exercise)    Wrist (Therapeutic Exercise)  AROM (active range of motion)  -     Wrist AROM (Therapeutic Exercise)  left;flexion;extension;10 repetitions  -     Row Name 01/07/21 1538          Hand (Therapeutic Exercise)    Hand (Therapeutic Exercise)  AROM (active range of motion)  -     Hand AROM/AAROM (Therapeutic Exercise)  left;AAROM (active assistive range of motion);finger extension  -     Row Name 01/07/21 1538          Balance    Balance Assessment  sitting static balance;sitting dynamic balance;standing static balance  -     Static Sitting Balance  WNL;unsupported;sitting in chair  -     Dynamic Sitting Balance  WNL;unsupported;sitting in chair  -     Static Standing Balance  WNL;unsupported;standing  -     Row Name 01/07/21 1538          Therapeutic Exercise    Therapeutic Exercise  elbow/forearm;wrist;hand  -       User Key  (r) = Recorded By, (t) = Taken By, (c) = Cosigned By    Initials Name Provider Type     Consuelo Whyte, OT Occupational Therapist        Goals/Plan     Row Name 01/07/21 1545          Transfer Goal 1 (OT)    Activity/Assistive Device (Transfer Goal 1, OT)  sit-to-stand/stand-to-sit;toilet  -     [place] Level/Cues Needed (Transfer Goal 1, OT)  supervision required  -HK     Time Frame (Transfer Goal 1, OT)  by discharge  -HK     Progress/Outcome (Transfer Goal 1, OT)  goal ongoing  -     Row Name 01/07/21 1540          Dressing Goal 1 (OT)    Activity/Device (Dressing Goal 1, OT)  upper body dressing Pt and wife will be able to dof/don sling  -     [place]/Cues Needed (Dressing Goal 1, OT)  supervision required  -HK     Time Frame  (Dressing Goal 1, OT)  by discharge  -HK     Progress/Outcome (Dressing Goal 1, OT)  goal ongoing  -HK     Row Name 01/07/21 1542          ROM Goal 1 (OT)    ROM Goal 1 (OT)  Pt and wife will be able to adequately demonstrate L UE HEP per surgeons preference.  -HK     Time Frame (ROM Goal 1, OT)  by discharge  -HK     Progress/Outcome (ROM Goal 1, OT)  goal ongoing  -HK       User Key  (r) = Recorded By, (t) = Taken By, (c) = Cosigned By    Initials Name Provider Type    HK Consuelo Whyte, OT Occupational Therapist        Clinical Impression     Row Name 01/07/21 1534          Pain Assessment    Additional Documentation  Pain Scale: Numbers Pre/Post-Treatment (Group)  -HK     Row Name 01/07/21 1531          Pain Scale: Numbers Pre/Post-Treatment    Pretreatment Pain Rating  1/10  -HK     Posttreatment Pain Rating  1/10  -HK     Pain Location - Side  Left  -HK     Pain Location - Orientation  generalized  -HK     Pain Location  other (see comments) L axilla  -HK     Row Name 01/07/21 1535          Plan of Care Review    Plan of Care Reviewed With  patient  -HK     Progress  improving  -HK     Outcome Summary  OT eval complete. Pt and wife educated on sling management as well as wear and care, shoulder precautions, axilla care, and care of nerve catheter during ADLS. Wife to complete teach back on 1/08. Pt completed AAROM elbow and AROM x10 reps wrist/hand.  Pt completes transfers and ambulates with CGA and no AD. O2 saturations >87 throughout all mobility, improved with cues for PLB. Pt passed mobility screen with score of 22 and OT to d/c PT order. Pt plans to d/c home with assist from wife when medically ready.  -     Row Name 01/07/21 1537          Therapy Assessment/Plan (OT)    Patient/Family Therapy Goal Statement (OT)  Pt would like to improve and return home.  -HK     Rehab Potential (OT)  good, to achieve stated therapy goals  -HK     Criteria for Skilled Therapeutic Interventions Met (OT)  yes;skilled  treatment is necessary  -HK     Therapy Frequency (OT)  daily  -HK     Row Name 01/07/21 1539          Therapy Plan Review/Discharge Plan (OT)    Anticipated Discharge Disposition (OT)  home with assist  -HK     Row Name 01/07/21 1539          Vital Signs    Pre Systolic BP Rehab  -- RN cleared for tx; VSS  -HK     Pre SpO2 (%)  94 2L  -HK     O2 Delivery Pre Treatment  supplemental O2  -HK     Intra SpO2 (%)  87  -HK     O2 Delivery Intra Treatment  room air  -HK     Post SpO2 (%)  93  -HK     O2 Delivery Post Treatment  room air  -HK     Pre Patient Position  Supine  -HK     Intra Patient Position  Standing  -HK     Post Patient Position  Sitting  -HK     Row Name 01/07/21 1539          Positioning and Restraints    Pre-Treatment Position  in bed  -HK     Post Treatment Position  chair  -HK     In Chair  notified nsg;reclined;call light within reach;encouraged to call for assist no exit alarm; RN aware.  -HK       User Key  (r) = Recorded By, (t) = Taken By, (c) = Cosigned By    Initials Name Provider Type    Consuelo Cox OT Occupational Therapist        Outcome Measures     Row Name 01/07/21 1547          How much help from another is currently needed...    Putting on and taking off regular lower body clothing?  2  -HK     Bathing (including washing, rinsing, and drying)  2  -HK     Toileting (which includes using toilet bed pan or urinal)  3  -HK     Putting on and taking off regular upper body clothing  2  -HK     Taking care of personal grooming (such as brushing teeth)  3  -HK     Eating meals  3  -HK     AM-PAC 6 Clicks Score (OT)  15  -HK     Row Name 01/07/21 1547          Functional Assessment    Outcome Measure Options  AM-PAC 6 Clicks Daily Activity (OT)  -HK       User Key  (r) = Recorded By, (t) = Taken By, (c) = Cosigned By    Initials Name Provider Type    Consuelo Cox OT Occupational Therapist        Occupational Therapy Education                 Title: PT OT SLP Therapies (Done)      Topic: Occupational Therapy (Done)     Point: ADL training (Done)     Description:   Instruct learner(s) on proper safety adaptation and remediation techniques during self care or transfers.   Instruct in proper use of assistive devices.              Learning Progress Summary           Patient Acceptance, E,TB,D,H, VU,DU by  at 1/7/2021 1548   Family Acceptance, E,TB,D,H, VU,DU by  at 1/7/2021 1548                   Point: Home exercise program (Done)     Description:   Instruct learner(s) on appropriate technique for monitoring, assisting and/or progressing therapeutic exercises/activities.              Learning Progress Summary           Patient Acceptance, E,TB,D,H, VU,DU by  at 1/7/2021 1548   Family Acceptance, E,TB,D,H, VU,DU by  at 1/7/2021 1548                   Point: Precautions (Done)     Description:   Instruct learner(s) on prescribed precautions during self-care and functional transfers.              Learning Progress Summary           Patient Acceptance, E,TB,D,H, VU,DU by  at 1/7/2021 1548   Family Acceptance, E,TB,D,H, VU,DU by  at 1/7/2021 1548                   Point: Body mechanics (Done)     Description:   Instruct learner(s) on proper positioning and spine alignment during self-care, functional mobility activities and/or exercises.              Learning Progress Summary           Patient Acceptance, E,TB,D,H, VU,DU by  at 1/7/2021 1548   Family Acceptance, E,TB,D,H, VU,DU by  at 1/7/2021 1548                               User Key     Initials Effective Dates Name Provider Type Discipline     03/07/18 -  Consuelo Whyte, OT Occupational Therapist OT              OT Recommendation and Plan  Therapy Frequency (OT): daily  Plan of Care Review  Plan of Care Reviewed With: patient  Progress: improving  Outcome Summary: OT eval complete. Pt and wife educated on sling management as well as wear and care, shoulder precautions, axilla care, and care of nerve catheter during ADLS. Wife to  complete teach back on 1/08. Pt completed AAROM elbow and AROM x10 reps wrist/hand.  Pt completes transfers and ambulates with CGA and no AD. O2 saturations >87 throughout all mobility, improved with cues for PLB. Pt passed mobility screen with score of 22 and OT to d/c PT order. Pt plans to d/c home with assist from wife when medically ready.     Time Calculation:   Time Calculation- OT     Row Name 01/07/21 1315             Time Calculation- OT    OT Start Time  1315  -      OT Received On  01/07/21  -      OT Goal Re-Cert Due Date  01/17/21  -         Timed Charges    26098 - OT Therapeutic Exercise Minutes  10  -HK      62449 - OT Self Care/Mgmt Minutes  30  -HK        User Key  (r) = Recorded By, (t) = Taken By, (c) = Cosigned By    Initials Name Provider Type     Consuelo Whyte OT Occupational Therapist        Therapy Charges for Today     Code Description Service Date Service Provider Modifiers Qty    17367983906 HC OT SELF CARE/MGMT/TRAIN EA 15 MIN 1/7/2021 Consuelo Whyte OT GO 2    10301474500 HC OT THER PROC EA 15 MIN 1/7/2021 Consuelo Whyte OT GO 1    10136206413 HC OT EVAL LOW COMPLEXITY 3 1/7/2021 Consuelo Whyte OT GO 1               Consuelo Whyte OT  1/7/2021

## 2021-01-07 NOTE — PLAN OF CARE
Patient arrived on the floor with minimal complaints of pain.  PNC in place at 6ml/hr.  Aquacel remained CDI.  Sling with wedge in place.  VS remained WNL.  Ambulated in room and with therapy.  In chair for the remainder of the afternoon.  Will continue to monitor.

## 2021-01-07 NOTE — H&P
Patient Name: Stanley Bethea  MRN: [id number]  : 1955  DOS: 2021    Attending: Franco Senior MD    Primary Care Provider: Yassine Velazquez MD      Chief complaint: Left shoulder pain    Subjective   Patient is a pleasant 65 y.o. male presented for scheduled surgery by     He underwent left reverse total shoulder arthroplasty under GA and a block, tolerated surgery well, was admitted for further management.    Seen in his room postoperatively, doing very well.  Controlled postop pain with interscalene block.  No complaints of nausea, vomiting, or shortness of breath.  He has voided.  Has not had lunch yet.     Allergies:  No Known Allergies    Meds:  Medications Prior to Admission   Medication Sig Dispense Refill Last Dose   • acetaminophen (TYLENOL) 500 MG tablet Take 1,000 mg by mouth 2 (Two) Times a Day As Needed for Mild Pain  or Moderate Pain .   2021 at 0900   • benzoyl peroxide (benzoyl peroxide) 5 % external liquid Apply topically to the appropriate area as directed. 148 g 0 2021 at 0400   • Calcium Carbonate-Vit D-Min (CALCIUM 1200 PO) Take 1 capsule by mouth Daily.   2021 at 0900   • Cholecalciferol (Vitamin D3) 50 MCG (2000 UT) tablet Take 1 tablet by mouth Daily.   2021 at 0900   • losartan (COZAAR) 100 MG tablet Take 100 mg by mouth Daily.   2021 at 0900   • multivitamin with minerals (EQ COMPLETE MULTIVITAMIN-ADULT PO) Take 1 tablet by mouth Daily.   2021 at 0900   • omeprazole (priLOSEC) 20 MG capsule Take 20 mg by mouth 2 (two) times a day.   2021 at 0900   • ondansetron (ZOFRAN) 4 MG tablet Take 1 tablet by mouth Every 8 (Eight) Hours As Needed for nausea. 30 tablet 0 to start after surgery   • tamsulosin (FLOMAX) 0.4 MG capsule 24 hr capsule Take 1 capsule by mouth Daily.   2021 at 2200         History:   Past Medical History:   Diagnosis Date   • Arthritis    • Heart murmur     from childhood   • History of head injury     6 years ago   •  "Kidney stone    • Sleep apnea      Past Surgical History:   Procedure Laterality Date   • COLONOSCOPY     • ENDOSCOPY     • GASTRECTOMY     • HERNIA REPAIR      inguinal, abdominal hernia   • JOINT REPLACEMENT      total hip on right side   • NASAL SINUS SURGERY     • ROTATOR CUFF REPAIR Bilateral    • SHOULDER ARTHROSCOPY       History reviewed. No pertinent family history.  Social History     Tobacco Use   • Smoking status: Never Smoker   • Smokeless tobacco: Never Used   Substance Use Topics   • Alcohol use: Not Currently     Frequency: Never   • Drug use: Never   .  Has 3 grown sons.  Retired from driving for [employer].    Review of Systems  Pertinent items are noted in HPI, all other systems reviewed and negative    Vital Signs  /87 (BP Location: Right arm, Patient Position: Lying)   Pulse 62   Temp 97.3 °F (36.3 °C) (Temporal)   Resp 18   Ht 167.6 cm (66\")   Wt 112 kg (246 lb)   SpO2 94%   BMI 39.71 kg/m²     Physical Exam:    General Appearance:    Alert, cooperative, in no acute distress   Head:    Normocephalic, without obvious abnormality, atraumatic   Eyes:            Lids and lashes normal, conjunctivae and sclerae normal, no   icterus, no pallor, corneas clear,    Ears:    Ears appear intact with no abnormalities noted   Throat:   No oral lesions, no thrush, oral mucosa moist   Neck:   No adenopathy, supple, trachea midline, no thyromegaly         Lungs:     Clear to auscultation,respirations regular, even and                   unlabored    Heart:    Regular rhythm and normal rate, normal S1 and S2, no      murmur    Abdomen:     Normal bowel sounds, no masses, no organomegaly, soft        non-tender, non-distended, no guarding, no rebound                 tenderness   Genitalia:    Deferred   Extremities:  Left UE in a sling, CDI Aquacel dressing shoulder. Interscalene nerve block cath present.  Distal pulses, cap refill, movements of fingers, wrist, intact.     Pulses:   Pulses palpable " and equal bilaterally   Skin:   No bleeding, bruising or rash   Neurologic:   Cranial nerves 2 - 12 grossly intact      I reviewed the patient's new clinical results.             Invalid input(s): NEUTOPHILPCT,  EOSPCT        Invalid input(s): LABALBU, PROT  Lab Results   Component Value Date    HGBA1C 5.60 12/30/2020       Results for MIRANDA BOX (MRN [id number]) as of 1/7/2021 12:00   Ref. Range 12/30/2020 15:08   Glucose Latest Ref Range: 65 - 99 mg/dL 87   Sodium Latest Ref Range: 136 - 145 mmol/L 139   Potassium Latest Ref Range: 3.5 - 5.2 mmol/L 4.2   CO2 Latest Ref Range: 22.0 - 29.0 mmol/L 29.0   Chloride Latest Ref Range: 98 - 107 mmol/L 103   Anion Gap Latest Ref Range: 5.0 - 15.0 mmol/L 7.0   Creatinine Latest Ref Range: 0.76 - 1.27 mg/dL 0.94   BUN Latest Ref Range: 8 - 23 mg/dL 18   BUN/Creatinine Ratio Latest Ref Range: 7.0 - 25.0  19.1   Calcium Latest Ref Range: 8.6 - 10.5 mg/dL 8.7   eGFR Non  Am Latest Ref Range: >60 mL/min/1.73 80   eGFR African Am Latest Ref Range: >60 mL/min/1.73 97   Alkaline Phosphatase Latest Ref Range: 39 - 117 U/L 84   Total Protein Latest Ref Range: 6.0 - 8.5 g/dL 6.2   ALT (SGPT) Latest Ref Range: 1 - 41 U/L 23   AST (SGOT) Latest Ref Range: 1 - 40 U/L 18   Total Bilirubin Latest Ref Range: 0.0 - 1.2 mg/dL 0.3   Albumin Latest Ref Range: 3.50 - 5.20 g/dL 3.90   Globulin Latest Units: gm/dL 2.3   A/G Ratio Latest Units: g/dL 1.7   Hemoglobin A1C Latest Ref Range: 4.80 - 5.60 % 5.60   Protime Latest Ref Range: 11.5 - 14.0 Seconds 12.7   INR Latest Ref Range: 0.85 - 1.16  0.98   PTT Latest Ref Range: 24.0 - 37.0 seconds 31.4   WBC Latest Ref Range: 3.40 - 10.80 10*3/mm3 6.44   RBC Latest Ref Range: 4.14 - 5.80 10*6/mm3 4.49   Hemoglobin Latest Ref Range: 13.0 - 17.7 g/dL 14.5   Hematocrit Latest Ref Range: 37.5 - 51.0 % 44.4   RDW Latest Ref Range: 12.3 - 15.4 % 12.5   MCV Latest Ref Range: 79.0 - 97.0 fL 98.9 (H)   MCH Latest Ref Range: 26.6 - 33.0 pg 32.3   MCHC  Latest Ref Range: 31.5 - 35.7 g/dL 32.7   MPV Latest Ref Range: 6.0 - 12.0 fL 9.1   Platelets Latest Ref Range: 140 - 450 10*3/mm3 256       Assessment and Plan:       Status post reverse total arthroplasty of left shoulder    Rotator cuff arthropathy of left shoulder    Obstructive sleep apnea    Obesity (BMI 30-39.9)    Essential hypertension      Plan    1. PT/OT. NWB, left UE, ROM hand, wrist, elbow.  2. Pain control-prns, interscalene nerve block cath with ropivacaine infusion.   3. IS-encourage  4. DVT proph- Mech/ mobilize.  5. Bowel regimen  6. Resume home medications as appropriate  7. Monitor post-op labs  8. DC planning for home    - Hypertension:  Resume home medications as appropriate, formulary substitution when indicated.  Holding parameters.  Prn medications for elevated blood pressure.    -SHARON:  Continue CPAP.   Monitor O2 sats.    -? BPH history: Resume tamsulosin, monitor for any postop urinary retention.    Discussed with patient and wife postop management plan, they expressed understanding and agreement.      Dragon disclaimer:  Part of this encounter note is an electronic transcription/translation of spoken language to printed text. The electronic translation of spoken language may permit erroneous, or at times, nonsensical words or phrases to be inadvertently transcribed; Although I have reviewed the note for such errors, some may still exist.    Leo Brothers MD  01/07/21  11:05 EST

## 2021-01-07 NOTE — H&P
Patient Care Team:      Chief complaint  Left shoulder pain    Subjective:    Patient is a 65 y.o.male presents with a history of previous left shoulder injury and arthroscopic surgery about 6-8 years ago.  He fell about 5 months ago and has pain since, he has had PT with no improvement   He presents today for shoulder replacement   Review of Systems:  General ROS: negative  Cardiovascular ROS: no chest pain or dyspnea on exertion  Respiratory ROS: no cough, shortness of breath, or wheezing      Allergies: No Known Allergies       Latex: neg  Contrast Dye neg    Home Meds    Medications Prior to Admission   Medication Sig Dispense Refill Last Dose   • acetaminophen (TYLENOL) 500 MG tablet Take 1,000 mg by mouth 2 (Two) Times a Day As Needed for Mild Pain  or Moderate Pain .   1/6/2021 at 0900   • benzoyl peroxide (benzoyl peroxide) 5 % external liquid Apply topically to the appropriate area as directed. 148 g 0 1/7/2021 at 0400   • Calcium Carbonate-Vit D-Min (CALCIUM 1200 PO) Take 1 capsule by mouth Daily.   1/6/2021 at 0900   • Cholecalciferol (Vitamin D3) 50 MCG (2000 UT) tablet Take 1 tablet by mouth Daily.   1/6/2021 at 0900   • losartan (COZAAR) 100 MG tablet Take 100 mg by mouth Daily.   1/6/2021 at 0900   • multivitamin with minerals (EQ COMPLETE MULTIVITAMIN-ADULT PO) Take 1 tablet by mouth Daily.   1/6/2021 at 0900   • omeprazole (priLOSEC) 20 MG capsule Take 20 mg by mouth 2 (two) times a day.   1/6/2021 at 0900   • ondansetron (ZOFRAN) 4 MG tablet Take 1 tablet by mouth Every 8 (Eight) Hours As Needed for nausea. 30 tablet 0 to start after surgery   • tamsulosin (FLOMAX) 0.4 MG capsule 24 hr capsule Take 1 capsule by mouth Daily.   1/5/2021 at 2200     PMH:   Past Medical History:   Diagnosis Date   • Arthritis    • Heart murmur     from childhood   • History of head injury     6 years ago   • Kidney stone    • Sleep apnea      PSH:    Past Surgical History:   Procedure Laterality Date   • COLONOSCOPY   "   • ENDOSCOPY     • GASTRECTOMY     • HERNIA REPAIR      inguinal, abdominal hernia   • JOINT REPLACEMENT      total hip on right side   • NASAL SINUS SURGERY     • ROTATOR CUFF REPAIR Bilateral    • SHOULDER ARTHROSCOPY       Immunization History: pneumo ?   Flu  2020  Tetanus  ?  Social History:   Tobacco neg   Alcohol neg      Physical Exam:/88 (BP Location: Right arm, Patient Position: Lying)   Pulse 63   Temp 97.3 °F (36.3 °C) (Temporal)   Resp 18   Ht 167.6 cm (66\")   Wt 112 kg (246 lb)   SpO2 98%   BMI 39.71 kg/m²       General Appearance:    Alert, cooperative, no distress, appears stated age   Head:    Normocephalic, without obvious abnormality, atraumatic   Lungs:     Clear to auscultation bilaterally, respirations unlabored    Heart: Regular rate and rhythm, S1 and S2 normal, no murmur, rub    or gallop    Abdomen:    Soft without tenderness   Breast Exam:    deferred   Genitalia:    deferred   Extremities:   Extremities normal, atraumatic, no cyanosis or edema   Skin:   Skin color, texture, turgor normal, no rashes or lesions   Neurologic:   Grossly intact     Results Review:   LABS:  Lab Results   Component Value Date    WBC 6.44 12/30/2020    HGB 14.5 12/30/2020    HCT 44.4 12/30/2020    MCV 98.9 (H) 12/30/2020     12/30/2020    NEUTROABS 3.60 12/30/2020    GLUCOSE 87 12/30/2020    BUN 18 12/30/2020    CREATININE 0.94 12/30/2020    EGFRIFNONA 80 12/30/2020    EGFRIFAFRI 97 12/30/2020     12/30/2020    K 4.2 12/30/2020     12/30/2020    CO2 29.0 12/30/2020    CALCIUM 8.7 12/30/2020    ALBUMIN 3.90 12/30/2020    AST 18 12/30/2020    ALT 23 12/30/2020    BILITOT 0.3 12/30/2020       RADIOLOGY:  Imaging Results (Last 72 Hours)     ** No results found for the last 72 hours. **               Cancer Patient: __ yes __no __unknown; If yes, clinical stage T:__ N:__M:__, stage group    Impression: left shoulder DJD    Plan: left reverse total shoulder arthroplasty  Briseida QUACH" PAUL Davis 1/7/2021 07:01 EST

## 2021-01-07 NOTE — ANESTHESIA POSTPROCEDURE EVALUATION
Patient: Stanley Bethea    Procedure Summary     Date: 01/07/21 Room / Location:  [facility] OR [address] [place] [address] [facility] OR    Anesthesia Start: 0730 Anesthesia Stop:     Procedure: TOTAL SHOULDER REVERSE ARTHROPLASTY LEFT (Left Shoulder) Diagnosis:       Rotator cuff arthropathy of left shoulder      (rotator cuff tear)    Surgeon: Franco Senior MD Provider: Rah Stanton MD    Anesthesia Type: general with block ASA Status: 3          Anesthesia Type: general with block    Vitals  No vitals data found for the desired time range.          Post Anesthesia Care and Evaluation    Patient location during evaluation: PACU  Patient participation: complete - patient participated  Level of consciousness: awake and alert  Pain score: 0  Pain management: adequate  Airway patency: patent  Anesthetic complications: No anesthetic complications  PONV Status: none  Cardiovascular status: hemodynamically stable and acceptable  Respiratory status: nonlabored ventilation, acceptable and nasal cannula  Hydration status: acceptable

## 2021-01-07 NOTE — ANESTHESIA PREPROCEDURE EVALUATION
Anesthesia Evaluation                  Airway   Mallampati: II  Dental      Pulmonary    (+) sleep apnea,   Cardiovascular         Neuro/Psych  GI/Hepatic/Renal/Endo      Musculoskeletal     Abdominal    Substance History      OB/GYN          Other                        Anesthesia Plan    ASA 3     general with block     intravenous induction     Anesthetic plan, all risks, benefits, and alternatives have been provided, discussed and informed consent has been obtained with: patient.

## 2021-01-07 NOTE — ANESTHESIA PROCEDURE NOTES
Left Interscalene Catheter      Patient reassessed immediately prior to procedure    Patient location during procedure: pre-op  Reason for block: at surgeon's request and post-op pain management  Performed by  CRNA: David Mitchell CRNA  Assisted by: Lobito Adams CRNA  Preanesthetic Checklist  Completed: patient identified, site marked, surgical consent, pre-op evaluation, timeout performed, IV checked, risks and benefits discussed and monitors and equipment checked  Prep:  Pt Position: right lateral decubitus  Sterile barriers:cap, gloves, mask and sterile barriers  Prep: ChloraPrep  Patient monitoring: blood pressure monitoring, continuous pulse oximetry and EKG  Procedure  Sedation:yes  Performed under: local infiltration  Guidance:ultrasound guided  Images:still images obtained, printed/placed on chart    Laterality:left  Block Type:interscalene  Injection Technique:catheter  Needle Type:Tuohy and echogenic  Needle Gauge:18 G  Resistance on Injection: none  Catheter Size:20 G (20g)  Cath Depth at skin: 13 cm    Medications Used: bupivacaine PF (MARCAINE) 0.25 % injection, 15 mL  fentaNYL citrate (PF) (SUBLIMAZE) injection, 100 mcg  Med admintered at 1/7/2021 7:11 AM      Post Assessment  Injection Assessment: negative aspiration for heme, no paresthesia on injection and incremental injection  Patient Tolerance:comfortable throughout block  Complications:no  Additional Notes  Procedure:                 The pt was placed in semifowlers position with a slight tilt of the thorax contralateral to the insertion site.  The Insertion Site was prepped and draped in sterile fashion.  The pt was anesthetized with  IV Sedation( see meds) and  Skin and cutaneous tissue was infiltrated and anesthetized with 1% Lidocaine 3 mls via a 25g needle.  Utilizing ultrasound guidance, a BBraun 2 inch 18 g Contiplex echogenic touhy needle was advanced in-plane.  Hydro dissection of tissue was achieved with Normal saline. Major  vessels(carotid and Internal Jugular) where visualized as the brachial plexus was approached at the approximate level of C-7/ T-1.  Cervical 5 and Branches of Cervical 6 nerve roots where visualized and the needle tip was placed posterior at the level of C-6 roots.  LA spread was visualized and injection was made incrementally every 5 mls with aspiration. Injection pressure was normal or little, there was no intraneural injection, no vascular injection.      The BBraun 20 g wire stylet  catheter was then placed under US guidance on the posterior aspect of the Brachial Plexus.  Location of catheter was confirmed with NS injection visualized with US . The tuohy was then removed and the skin was sealed with Skin AFix at catheter insertion site.  Skin was prepped with mastisol and the labeled catheter  was secured with steristrips and a CHG tegaderm. Thank You.

## 2021-01-07 NOTE — PLAN OF CARE
Goal Outcome Evaluation:  Plan of Care Reviewed With: patient  Progress: improving  Outcome Summary: OT eval complete. Pt and wife educated on sling management as well as wear and care, shoulder precautions, axilla care, and care of nerve catheter during ADLS. Wife to complete teach back on 1/08. Pt completed AAROM elbow and AROM x10 reps wrist/hand.  Pt completes transfers and ambulates with CGA and no AD. O2 saturations >87 throughout all mobility, improved with cues for PLB. Pt passed mobility screen with score of 22 and OT to d/c PT order. Pt plans to d/c home with assist from wife when medically ready.

## 2021-01-07 NOTE — ANESTHESIA PROCEDURE NOTES
Airway  Urgency: elective    Date/Time: 1/7/2021 7:35 AM  Airway not difficult    General Information and Staff    Patient location during procedure: OR  CRNA: Lobito Adams CRNA    Indications and Patient Condition  Indications for airway management: airway protection    Preoxygenated: yes  MILS not maintained throughout  Mask difficulty assessment: 1 - vent by mask    Final Airway Details  Final airway type: endotracheal airway      Successful airway: ETT  Cuffed: yes   Successful intubation technique: direct laryngoscopy  Endotracheal tube insertion site: oral  Blade: Ayala  Blade size: 2  ETT size (mm): 7.5  Cormack-Lehane Classification: grade I - full view of glottis  Placement verified by: chest auscultation and capnometry   Cuff volume (mL): 5  Measured from: lips  ETT/EBT  to lips (cm): 22  Number of attempts at approach: 1  Assessment: lips, teeth, and gum same as pre-op and atraumatic intubation    Additional Comments  Negative epigastric sounds, Breath sound equal bilaterally with symmetric chest rise and fall

## 2021-01-08 VITALS
HEART RATE: 77 BPM | SYSTOLIC BLOOD PRESSURE: 109 MMHG | RESPIRATION RATE: 17 BRPM | TEMPERATURE: 98.3 F | DIASTOLIC BLOOD PRESSURE: 75 MMHG | BODY MASS INDEX: 39.53 KG/M2 | WEIGHT: 246 LBS | HEIGHT: 66 IN | OXYGEN SATURATION: 94 %

## 2021-01-08 LAB
ANION GAP SERPL CALCULATED.3IONS-SCNC: 9 MMOL/L (ref 5–15)
BASOPHILS # BLD AUTO: 0.01 10*3/MM3 (ref 0–0.2)
BASOPHILS NFR BLD AUTO: 0.1 % (ref 0–1.5)
BUN SERPL-MCNC: 18 MG/DL (ref 8–23)
BUN/CREAT SERPL: 21.2 (ref 7–25)
CALCIUM SPEC-SCNC: 8.8 MG/DL (ref 8.6–10.5)
CHLORIDE SERPL-SCNC: 101 MMOL/L (ref 98–107)
CO2 SERPL-SCNC: 26 MMOL/L (ref 22–29)
CREAT SERPL-MCNC: 0.85 MG/DL (ref 0.76–1.27)
DEPRECATED RDW RBC AUTO: 44.1 FL (ref 37–54)
EOSINOPHIL # BLD AUTO: 0.01 10*3/MM3 (ref 0–0.4)
EOSINOPHIL NFR BLD AUTO: 0.1 % (ref 0.3–6.2)
ERYTHROCYTE [DISTWIDTH] IN BLOOD BY AUTOMATED COUNT: 12.2 % (ref 12.3–15.4)
GFR SERPL CREATININE-BSD FRML MDRD: 109 ML/MIN/1.73
GFR SERPL CREATININE-BSD FRML MDRD: 90 ML/MIN/1.73
GLUCOSE SERPL-MCNC: 119 MG/DL (ref 65–99)
HCT VFR BLD AUTO: 43.8 % (ref 37.5–51)
HGB BLD-MCNC: 14.4 G/DL (ref 13–17.7)
IMM GRANULOCYTES # BLD AUTO: 0.05 10*3/MM3 (ref 0–0.05)
IMM GRANULOCYTES NFR BLD AUTO: 0.4 % (ref 0–0.5)
LYMPHOCYTES # BLD AUTO: 1.63 10*3/MM3 (ref 0.7–3.1)
LYMPHOCYTES NFR BLD AUTO: 13.5 % (ref 19.6–45.3)
MCH RBC QN AUTO: 32 PG (ref 26.6–33)
MCHC RBC AUTO-ENTMCNC: 32.9 G/DL (ref 31.5–35.7)
MCV RBC AUTO: 97.3 FL (ref 79–97)
MONOCYTES # BLD AUTO: 1.13 10*3/MM3 (ref 0.1–0.9)
MONOCYTES NFR BLD AUTO: 9.3 % (ref 5–12)
NEUTROPHILS NFR BLD AUTO: 76.6 % (ref 42.7–76)
NEUTROPHILS NFR BLD AUTO: 9.28 10*3/MM3 (ref 1.7–7)
NRBC BLD AUTO-RTO: 0 /100 WBC (ref 0–0.2)
PLATELET # BLD AUTO: 257 10*3/MM3 (ref 140–450)
PMV BLD AUTO: 9.3 FL (ref 6–12)
POTASSIUM SERPL-SCNC: 3.8 MMOL/L (ref 3.5–5.2)
RBC # BLD AUTO: 4.5 10*6/MM3 (ref 4.14–5.8)
SODIUM SERPL-SCNC: 136 MMOL/L (ref 136–145)
WBC # BLD AUTO: 12.11 10*3/MM3 (ref 3.4–10.8)

## 2021-01-08 PROCEDURE — 97110 THERAPEUTIC EXERCISES: CPT

## 2021-01-08 PROCEDURE — 97535 SELF CARE MNGMENT TRAINING: CPT

## 2021-01-08 PROCEDURE — 80048 BASIC METABOLIC PNL TOTAL CA: CPT | Performed by: ORTHOPAEDIC SURGERY

## 2021-01-08 PROCEDURE — 97530 THERAPEUTIC ACTIVITIES: CPT

## 2021-01-08 PROCEDURE — 85025 COMPLETE CBC W/AUTO DIFF WBC: CPT | Performed by: ORTHOPAEDIC SURGERY

## 2021-01-08 RX ORDER — OXYCODONE HYDROCHLORIDE 5 MG/1
5 TABLET ORAL EVERY 4 HOURS PRN
Qty: 40 TABLET | Refills: 0 | Status: SHIPPED | OUTPATIENT
Start: 2021-01-08

## 2021-01-08 RX ORDER — DOCUSATE SODIUM 100 MG/1
100 CAPSULE, LIQUID FILLED ORAL 2 TIMES DAILY
Qty: 30 CAPSULE | Refills: 0 | Status: SHIPPED | OUTPATIENT
Start: 2021-01-08 | End: 2021-01-23

## 2021-01-08 RX ADMIN — OXYCODONE 5 MG: 5 TABLET ORAL at 02:36

## 2021-01-08 RX ADMIN — PANTOPRAZOLE SODIUM 40 MG: 40 TABLET, DELAYED RELEASE ORAL at 05:37

## 2021-01-08 RX ADMIN — ACETAMINOPHEN 650 MG: 325 TABLET, FILM COATED ORAL at 11:13

## 2021-01-08 RX ADMIN — ACETAMINOPHEN 650 MG: 325 TABLET, FILM COATED ORAL at 04:34

## 2021-01-08 NOTE — DISCHARGE SUMMARY
Patient Name: Stanley Bethea  MRN: [id number]  : 1955  DOS: 2021    Attending: Franco Senior MD    Primary Care Provider: Yassine Velazquez MD    Date of Admission:.2021  5:32 AM    Date of Discharge:  2021    Discharge Diagnosis:     Status post reverse total arthroplasty of left shoulder    Rotator cuff arthropathy of left shoulder    Obstructive sleep apnea    Obesity (BMI 30-39.9)    Essential hypertension      Hospital Course    At admit:  Patient is a pleasant 65 y.o. male presented for scheduled surgery by      He underwent left reverse total shoulder arthroplasty under GA and a block, tolerated surgery well, was admitted for further management.     Seen in his room postoperatively, doing very well.  Controlled postop pain with interscalene block.  No complaints of nausea, vomiting, or shortness of breath.  He has voided.  Has not had lunch yet.    After admit:  Patient was provided pain medications as needed for pain control, along with interscalene nerve block infusion of Ropivacaine    Adjustments were made to pain medications to optimize postop pain management.   Risks and benefits of opiate medications discussed with patient. JYOTHI report on chart was reviewed.    The patient was seen by OT and was taught exercises for left arm.  The patient used an IS for atelectasis prophylaxis and mechanicals for DVT prophylaxis.    Home medications were resumed as appropriate, and labs were monitored and remained fairly stable.     With the progress he has made, Mr. Bethea is ready for DC home today.      The patient will have an arrow pump ( instructed on it during this admit)  Discussed with patient regarding plan and he shows understanding and agreement.        Procedures Performed  Procedure(s):  TOTAL SHOULDER REVERSE ARTHROPLASTY LEFT       Pertinent Test Results:    I reviewed the patient's new clinical results.   Results from last 7 days   Lab Units 21  0716   WBC  "10*3/mm3 12.11*   HEMOGLOBIN g/dL 14.4   HEMATOCRIT % 43.8   PLATELETS 10*3/mm3 257     Results from last 7 days   Lab Units 21  0716   SODIUM mmol/L 136   POTASSIUM mmol/L 3.8   CHLORIDE mmol/L 101   CO2 mmol/L 26.0   BUN mg/dL 18   CREATININE mg/dL 0.85   CALCIUM mg/dL 8.8   GLUCOSE mg/dL 119*     I reviewed the patient's new imaging including images and reports.      Occupational Therapy    Outcome Summary: Pt alert, O x 4, agreeable to OT tx. Pt completed bed mobility with spv with HOB elevated and bed rail used yet w/ plan to sleep in recliner at home. Pt completed sit < > stand w/ SBA, fxl mobility with CGA improved to SBA when pt decreased speed and closely monitored position of LUE in sling in turning and through doorways. Pt ascended, descended 3 steps with SBA. SPO2  decreased to 89% one time and improved to > 90% with PLB and remained > 90%. Pt able to complete UBD over head shirt with min A largely for posterior mgmt and mgmt around nerve cath, spouse demonstrated lead with sling mgmt with need for SBA grossly for cues for mgmt, adjustment, min A for LBD (initial threading) and CGA for L axilla care for posterior A all while adhering to L shoulder precautions. OT reviewed with pt/spouse the optimal position, tech, safety awareness for ADLs, wear schedule and mgmt for sling and reiterated no showering while nerve cath still in place. Completed HEP as indicated by MD with 1 set x 10 reps in session with only min discomfort and fatigue reported post TE. Will progress pt as able while hospitalized.      Discharge Assessment:       Visit Vitals  /72 (BP Location: Right arm, Patient Position: Lying)   Pulse 81   Temp 98.7 °F (37.1 °C) (Oral)   Resp 16   Ht 167.6 cm (66\")   Wt 112 kg (246 lb)   SpO2 92%   BMI 39.71 kg/m²     Temp (24hrs), Av.1 °F (36.7 °C), Min:97.3 °F (36.3 °C), Max:98.7 °F (37.1 °C)      General Appearance:    Alert, cooperative, in no acute distress   Lungs:     Clear to " auscultation,respirations regular, even and   unlabored    Heart:    Regular rhythm and normal rate, normal S1 and S2    Abdomen:     Normal bowel sounds, no masses, no organomegaly, soft        non-tender, non-distended, no guarding, no rebound                 tenderness   Extremities:  Left UE in a sling, CDI Aquacel dressing over left shoulder incision . Interscalene nerve block cath present.  Distal pulses, cap refill,  intact. Movement and sensation of hand and wrist.     Pulses:   Pulses palpable and equal bilaterally   Skin:   No bleeding, bruising or rash        Discharge Disposition: Home          Discharge Medications      New Medications      Instructions Start Date   docusate sodium 100 MG capsule  Commonly known as: COLACE   100 mg, Oral, 2 Times Daily      oxyCODONE 5 MG immediate release tablet  Commonly known as: Roxicodone   5 mg, Oral, Every 4 Hours PRN      Ropivacine HCl-NaCl  Commonly known as: NAROPIN   6 mL/hr (12 mg/hr), Peripheral Nerve, Continuous         Continue These Medications      Instructions Start Date   acetaminophen 500 MG tablet  Commonly known as: TYLENOL   1,000 mg, Oral, 2 Times Daily PRN      CALCIUM 1200 PO   1 capsule, Oral, Daily      losartan 100 MG tablet  Commonly known as: COZAAR   100 mg, Oral, Daily      multivitamin with minerals tablet tablet   1 tablet, Oral, Daily      omeprazole 20 MG capsule  Commonly known as: priLOSEC   20 mg, Oral, 2 times daily      ondansetron 4 MG tablet  Commonly known as: ZOFRAN   Take 1 tablet by mouth Every 8 (Eight) Hours As Needed for nausea.      tamsulosin 0.4 MG capsule 24 hr capsule  Commonly known as: FLOMAX   1 capsule, Oral, Daily      Vitamin D3 50 MCG (2000 UT) tablet   1 tablet, Oral, Daily         Stop These Medications    benzoyl peroxide 5 % external liquid  Generic drug: benzoyl peroxide            Discharge Diet:   Resume prehospital diet  Activity at Discharge:   NWB left upper extremity, ROM elbow, wrist, and hand  per Dr. Senior's instructions.    Follow-up Appointments  Franco Senior MD per his orders         Leo Brothers MD  01/08/21  10:24 EST

## 2021-01-08 NOTE — PROGRESS NOTES
Discharge Planning Assessment  [facility]     Patient Name: Stanley Btehea  MRN: [id number]  Today's Date: 1/8/2021    Admit Date: 1/7/2021    Discharge Needs Assessment     Row Name 01/08/21 1209       Living Environment    Lives With  spouse    Name(s) of Who Lives With Patient  Hien Bethea,  [phone]    Current Living Arrangements  home/apartment/condo    Family Caregiver if Needed  spouse    Quality of Family Relationships  helpful;involved;supportive    Able to Return to Prior Arrangements  yes       Resource/Environmental Concerns    Resource/Environmental Concerns  none       Transition Planning    Patient/Family Anticipates Transition to  home with family    Transportation Anticipated  family or friend will provide       Discharge Needs Assessment    Readmission Within the Last 30 Days  no previous admission in last 30 days    Equipment Currently Used at Home  cpap    Concerns to be Addressed  no discharge needs identified    Equipment Needed After Discharge  none        Discharge Plan     Row Name 01/08/21 1212       Plan    Plan  Home with wife's assistance    Patient/Family in Agreement with Plan  yes    Plan Comments  Met with Mr. Bethea and his wife, Hien, at the bedside for discharge planning.  Mr. Bethea lives with his wife in a one story home, 3 steps to enter, in [facility].  He is ambulating independently. He denies any DME needs or home health needs.  Mr. Beteha states that his wife will be available to assist him at home as needed and will be transporting him home when discharged.  CM will continue to follow.    Final Discharge Disposition Code  01 - home or self-care        Continued Care and Services - Admitted Since 1/7/2021    Coordination has not been started for this encounter.       Expected Discharge Date and Time     Expected Discharge Date Expected Discharge Time    Jan 8, 2021         Demographic Summary     Row Name 01/08/21 1202       General Information    Admission Type   inpatient    Arrived From  home    Reason for Consult  discharge planning    General Information Comments  PCP:  Yassine Velazquez       Contact Information    Permission Granted to Share Info With          Functional Status     Row Name 01/08/21 1208       Functional Status    Usual Activity Tolerance  good    Current Activity Tolerance  -- See OT notes       Functional Status, IADL    Medications  independent    Meal Preparation  independent    Housekeeping  independent    Laundry  independent    Shopping  independent       Mental Status    General Appearance WDL  WDL        Psychosocial    No documentation.       Abuse/Neglect    No documentation.       Legal    No documentation.       Substance Abuse    No documentation.       Patient Forms    No documentation.           Diana Fox RN

## 2021-01-08 NOTE — PROGRESS NOTES
[facility]    Acute pain service Inpatient Progress Note    Patient Name: Stanley Bethea  :  1955  MRN:  [id number]        Acute Pain  Service Inpatient Progress Note:    Analgesia:Excellent  LOC: alert and awake  Resp Status: room air  Cardiac: VS stable  Side Effects:None  Catheter Site:clean, dressing intact and dry  Cath type: peripheral nerve cath with ON Q  Infusion rate: 6ml/hr  Catheter Plan:Catheter to remain Insitu and Continue catheter infusion rate unchanged  Comments: ---------------------------------------------------------------------------------  Physical Therapy Manual Muscle Testing Results:   ]    Physical Therapy - Plan of Care Review - Outcome Summary:   ]    Occupational Therapy - Plan of Care Review - Outcome Summary:  Outcome Summary: OT eval complete. Pt and wife educated on sling management as well as wear and care, shoulder precautions, axilla care, and care of nerve catheter during ADLS. Wife to complete teach back on . Pt completed AAROM elbow and AROM x10 reps wrist/hand.  Pt completes transfers and ambulates with CGA and no AD. O2 saturations >87 throughout all mobility, improved with cues for PLB. Pt passed mobility screen with score of 22 and OT to d/c PT order. Pt plans to d/c home with assist from wife when medically ready. ([phone])]  ----------------------------------------------------------------------------------

## 2021-01-08 NOTE — THERAPY TREATMENT NOTE
Patient Name: Stanley Bethea  : 1955    MRN: [id number]                              Today's Date: 2021       Admit Date: 2021    Visit Dx:     ICD-10-CM ICD-9-CM   1. Status post reverse total arthroplasty of left shoulder  Z96.612 V43.61     Patient Active Problem List   Diagnosis   • Rotator cuff arthropathy of left shoulder   • Obstructive sleep apnea   • Obesity (BMI 30-39.9)   • Essential hypertension   • Status post reverse total arthroplasty of left shoulder     Past Medical History:   Diagnosis Date   • Arthritis    • Heart murmur     from childhood   • History of head injury     6 years ago   • Kidney stone    • Sleep apnea      Past Surgical History:   Procedure Laterality Date   • COLONOSCOPY     • ENDOSCOPY     • GASTRECTOMY     • HERNIA REPAIR      inguinal, abdominal hernia   • JOINT REPLACEMENT      total hip on right side   • NASAL SINUS SURGERY     • ROTATOR CUFF REPAIR Bilateral    • SHOULDER ARTHROSCOPY     • TOTAL SHOULDER ARTHROPLASTY W/ DISTAL CLAVICLE EXCISION Left 2021    Procedure: TOTAL SHOULDER REVERSE ARTHROPLASTY LEFT;  Surgeon: Franco Senior MD;  Location: [facility];  Service: Orthopedics;  Laterality: Left;     General Information     Row Name 21 1123          OT Time and Intention    Document Type  therapy note (daily note)  -JY     Mode of Treatment  occupational therapy;individual therapy  -JY     Row Name 21 1123          General Information    Patient Profile Reviewed  yes  -JY     Existing Precautions/Restrictions  fall;non-weight bearing;left;shoulder;other (see comments) leora ENRIQUE ultra II sling w/ pillow, interscalene nerve cath  -JY     Row Name 21 1123          Cognition    Orientation Status (Cognition)  oriented x 4  -JY     Row Name 21 1123          Safety Issues, Functional Mobility    Safety Issues Affecting Function (Mobility)  safety precaution awareness;safety precautions follow-through/compliance  -JY      Impairments Affecting Function (Mobility)  pain;strength;range of motion (ROM)  -JY     Comment, Safety Issues/Impairments (Mobility)  alert, follows commands, no LOB noted; only reviewed safety awareness of plmt of LUE in sling during turning and through narrow pathways to prevent unnecessary contact/injury  -JY       User Key  (r) = Recorded By, (t) = Taken By, (c) = Cosigned By    Initials Name Provider Type    Elizabeth Taylor OT Occupational Therapist          Mobility/ADL's     Row Name 01/08/21 1125          Bed Mobility    Bed Mobility  supine-sit;scooting/bridging  -JY     Scooting/Bridging [place] (Bed Mobility)  modified independence;verbal cues  -JY     Supine-Sit [place] (Bed Mobility)  modified independence;verbal cues  -JY     Bed Mobility, Safety Issues  decreased use of arms for pushing/pulling  -JY     Assistive Device (Bed Mobility)  bed rails;head of bed elevated  -JY     Comment (Bed Mobility)  pt did utilize HOB elevated and bed rail PRN of which pt does not have at home, however pt plan is to sleep in recliner at d/c with RUE support to adhere to LUE precautions; pt exited bed on preferred side and adhered to prec  -JY     Row Name 01/08/21 1125          Transfers    Transfers  sit-stand transfer  -JY     Comment (Transfers)  skilled cue to ensure no weight bearing through LUE; pt demonstrated good safety awareness in ascend, descend; reiterated to align self toward R side of chair to allow sling placement  -JY     Sit-Stand [place] (Transfers)  standby assist;verbal cues  -JY     Row Name 01/08/21 1125          Sit-Stand Transfer    Assistive Device (Sit-Stand Transfers)  other (see comments) gait belt  -JY     Row Name 01/08/21 1125          Functional Mobility    Functional Mobility- Ind. Level  contact guard assist;standby assist;verbal cues required  -JY     Functional Mobility- Device  other (see comments) gait belt donned  -JY     Functional Mobility-Distance (Feet)  --  350  -JY     Functional Mobility-Maintain WBing  able to maintain weight bearing status  -JY     Functional Mobility- Comment  pt demonstrated need for CGA to improved SBA with no AD, provided cues for decreased speed and close monitoring of LUE in sling and adherence contributed to SBA, SPO2 decreased to 89% on one occasion, returned with PLB, otherwise > 90%. Ascended/descend  steps with HR support with SBA  -     Row Name 01/08/21 1125          Activities of Daily Living    BADL Assessment/Intervention  bathing;upper body dressing;lower body dressing  -     Row Name 01/08/21 1125          Mobility    Extremity Weight-bearing Status  left upper extremity  -JY     Left Upper Extremity (Weight-bearing Status)  (S) non weight-bearing (NWB)  -     Row Name 01/08/21 1125          Bathing Assessment/Intervention    [place] Level (Bathing)  bathing skills;upper extremities;other (see comments);contact guard assist;verbal cues L axilla care  -JY     Assistive Devices (Bathing)  other (see comments) morteza tech  -JY     Position (Bathing)  unsupported sitting;edge of bed sitting  -JY     Comment (Bathing)  re educated pt on completion of L axilla care while maintaining shoulder precautions including from preferred position (seated pendulum) and flossing tech, reiterated no showering while nerve cath still in place; CGA for posterior reach  -     Row Name 01/08/21 1125          Upper Body Dressing Assessment/Training    [place] Level (Upper Body Dressing)  doff;pajama/robe;don;pull-over garment;minimum assist (75% patient effort);verbal cues;other (see comments) sling mgmt with CG lead with SBA for cues for plmt, adjustment  -JY     Assistive Devices (Upper Body Dressing)  other (see comments) morteza tech  -JY     Position (Upper Body Dressing)  unsupported sitting  -JY     Comment (Upper Body Dressing)  pt and spouse re educated on UBD with emphasis on sling mgmt and d/d over the head and front opening  garments with emphasis on position (seated pendulum) and tech including seq and close monitoring of nerve catheter to prevent dislodging; spouse able to complete teach back, demo of sling mgmt with SBA for cueing purposes for strap plmt and adjustment; min A for shirt for over the head  and posterior mgmt  -     Row Name 01/08/21 1125          Lower Body Dressing Assessment/Training    [place] Level (Lower Body Dressing)  doff;don;pants/bottoms;other (see comments);minimum assist (75% patient effort);verbal cues undergarments  -JY     Position (Lower Body Dressing)  unsupported sitting  -JY     Comment (Lower Body Dressing)  pt able to demo morteza tech and assist with upward mgmt after spouse initiated threading  -       User Key  (r) = Recorded By, (t) = Taken By, (c) = Cosigned By    Initials Name Provider Type    Elizabeth Taylor OT Occupational Therapist        Obj/Interventions     Row Name 01/08/21 1138          Sensory Assessment (Somatosensory)    Sensory Assessment (Somatosensory)  left UE  -     Left UE Sensory Assessment  general sensation  -     Sensory Subjective Reports  insensate  -     Row Name 01/08/21 1138          Sensory Interventions    Comment, Sensory Intervention  pt continues to report decreased sensation at L hand and proximal shoulder  -     Row Name 01/08/21 1138          Elbow/Forearm (Therapeutic Exercise)    Elbow/Forearm (Therapeutic Exercise)  AAROM (active assistive range of motion)  -     Elbow/Forearm AAROM (Therapeutic Exercise)  left;flexion;extension;supination;pronation;sitting;10 repetitions  -     Row Name 01/08/21 1138          Wrist (Therapeutic Exercise)    Wrist (Therapeutic Exercise)  AROM (active range of motion)  -     Wrist AROM (Therapeutic Exercise)  left;flexion;extension;10 repetitions  -     Row Name 01/08/21 1138          Hand (Therapeutic Exercise)    Hand (Therapeutic Exercise)  AROM (active range of motion)  -     Hand AROM/AAROM  (Therapeutic Exercise)  left;AROM (active range of motion);finger flexion;finger extension;10 repetitions  -JY     Row Name 01/08/21 1138          Balance    Balance Assessment  sitting static balance;sitting dynamic balance;standing static balance;standing dynamic balance  -JY     Static Sitting Balance  WNL;unsupported;sitting, edge of bed;supported;sitting in chair  -JY     Dynamic Sitting Balance  WNL;unsupported;sitting, edge of bed;supported;sitting in chair;other (see comments) UBD, HEP  -JY     Static Standing Balance  WNL;unsupported;standing  -JY     Dynamic Standing Balance  WFL;unsupported;standing;other (see comments) fxl t/f, mobility  -JY     Balance Interventions  sitting;standing;static;dynamic;sit to stand;supported;occupation based/functional task  -JY     Comment, Balance  pt presented with no LOB and utilized no AD  -JY     Row Name 01/08/21 1138          Therapeutic Exercise    Therapeutic Exercise  elbow/forearm;wrist;hand;other (see comments) reviewed with pt/spouse the HEP as indicated by MD with emphasis on L elbow, hand, wrist; gross AROM, req'd AAROM for full ROM elbow flex/ext and supination  -JY       User Key  (r) = Recorded By, (t) = Taken By, (c) = Cosigned By    Initials Name Provider Type    Elizabeth Taylor OT Occupational Therapist        Goals/Plan    No documentation.       Clinical Impression     Row Name 01/08/21 1142          Pain Assessment    Additional Documentation  Pain Scale: Numbers Pre/Post-Treatment (Group)  -JY     Row Name 01/08/21 1142          Pain Scale: Numbers Pre/Post-Treatment    Pretreatment Pain Rating  1/10  -JY     Posttreatment Pain Rating  1/10  -JY     Pain Location - Side  Left  -JY     Pain Location - Orientation  generalized  -JY     Pain Location  other (see comments) L axilla  -JY     Pre/Posttreatment Pain Comment  tolerated all OT intervention, report of increased discomfort (not pain) and fatigue at L shoulder post exercise  -JY     Pain  Intervention(s)  Cold applied;Repositioned;Ambulation/increased activity  -JY     Row Name 01/08/21 1142          Plan of Care Review    Plan of Care Reviewed With  patient;spouse  -BRITTANI     Progress  improving  -JY     Outcome Summary  Pt alert, O x 4, agreeable to OT tx. Pt completed bed mobility with spv with HOB elevated and bed rail used yet w/ plan to sleep in recliner at home. Pt completed sit < > stand w/ SBA, fxl mobility with CGA improved to SBA when pt decreased speed and closely monitored position of LUE in sling in turning and through doorways. Pt ascended, descended 3 steps with SBA. SPO2  decreased to 89% one time and improved to > 90% with PLB and remained > 90%. Pt able to complete UBD over head shirt with min A largely for posterior mgmt and mgmt around nerve cath, spouse demonstrated lead with sling mgmt with need for SBA grossly for cues for mgmt, adjustment, min A for LBD (initial threading) and CGA for L axilla care for posterior A all while adhering to L shoulder precautions. OT reviewed with pt/spouse the optimal position, tech, safety awareness for ADLs, wear schedule and mgmt for sling and reiterated no showering while nerve cath still in place. Completed HEP as indicated by MD with 1 set x 10 reps in session with only min discomfort and fatigue reported post TE. Will progress pt as able while hospitalized.  -JY     Row Name 01/08/21 1142          Vital Signs    Pre Systolic BP Rehab  124  -JY     Pre Treatment Diastolic BP  72  -JY     Pretreatment Heart Rate (beats/min)  81  -JY     Pre SpO2 (%)  92  -JY     O2 Delivery Pre Treatment  room air  -JY     Intra SpO2 (%)  89  -JY     O2 Delivery Intra Treatment  room air  -JY     Post SpO2 (%)  93  -JY     O2 Delivery Post Treatment  room air  -JY     Pre Patient Position  Supine  -JY     Intra Patient Position  Standing  -JY     Post Patient Position  Sitting  -JY     Row Name 01/08/21 1142          Positioning and Restraints    Pre-Treatment  Position  in bed  -JY     Post Treatment Position  chair  -JY     In Chair  notified nsg;reclined;call light within reach;encouraged to call for assist;exit alarm on;with family/caregiver;with brace;legs elevated pt def SCDs thus ed on ankle pumps, ice pack donned, sling donned and nerve cath intact  -JY       User Key  (r) = Recorded By, (t) = Taken By, (c) = Cosigned By    Initials Name Provider Type    Elizabeth Taylor OT Occupational Therapist        Outcome Measures     Row Name 01/08/21 1150          How much help from another is currently needed...    Putting on and taking off regular lower body clothing?  3  -JY     Bathing (including washing, rinsing, and drying)  3  -JY     Toileting (which includes using toilet bed pan or urinal)  3  -JY     Putting on and taking off regular upper body clothing  3  -JY     Taking care of personal grooming (such as brushing teeth)  3  -JY     Eating meals  3  -JY     AM-PAC 6 Clicks Score (OT)  18  -JY     Row Name 01/08/21 1150          Functional Assessment    Outcome Measure Options  AM-PAC 6 Clicks Daily Activity (OT)  -JY       User Key  (r) = Recorded By, (t) = Taken By, (c) = Cosigned By    Initials Name Provider Type    Elizabeth Taylor OT Occupational Therapist        Occupational Therapy Education                 Title: PT OT SLP Therapies (Done)     Topic: Occupational Therapy (Done)     Point: ADL training (Done)     Description:   Instruct learner(s) on proper safety adaptation and remediation techniques during self care or transfers.   Instruct in proper use of assistive devices.              Learning Progress Summary           Patient Acceptance, D,E, VU,DU by BRITTANI at 1/8/2021 0858    Acceptance, E,TB,D,H, VU,DU by MICAH at 1/7/2021 1548   Family Acceptance, D,E, VU,DU by BRITTANI at 1/8/2021 0858    Acceptance, E,TB,D,H, VU,DU by  at 1/7/2021 1548                   Point: Home exercise program (Done)     Description:   Instruct learner(s) on appropriate  technique for monitoring, assisting and/or progressing therapeutic exercises/activities.              Learning Progress Summary           Patient Acceptance, D,E, VU,DU by JJAMES at 1/8/2021 0858    Acceptance, E,TB,D,H, VU,DU by  at 1/7/2021 1548   Family Acceptance, D,E, VU,DU by JY at 1/8/2021 0858    Acceptance, E,TB,D,H, VU,DU by  at 1/7/2021 1548                   Point: Precautions (Done)     Description:   Instruct learner(s) on prescribed precautions during self-care and functional transfers.              Learning Progress Summary           Patient Acceptance, D,E, VU,DU by BRITTANI at 1/8/2021 0858    Acceptance, E,TB,D,H, VU,DU by  at 1/7/2021 1548   Family Acceptance, D,E, VU,DU by JY at 1/8/2021 0858    Acceptance, E,TB,D,H, VU,DU by  at 1/7/2021 1548                   Point: Body mechanics (Done)     Description:   Instruct learner(s) on proper positioning and spine alignment during self-care, functional mobility activities and/or exercises.              Learning Progress Summary           Patient Acceptance, D,E, VU,DU by BRITTANI at 1/8/2021 0858    Acceptance, E,TB,D,H, VU,DU by  at 1/7/2021 1548   Family Acceptance, D,E, VU,DU by JY at 1/8/2021 0858    Acceptance, E,TB,D,H, VU,DU by  at 1/7/2021 1548                               User Key     Initials Effective Dates Name Provider Type Discipline     03/07/18 -  Consuelo Whyte, OT Occupational Therapist OT    J 01/29/20 -  Elizabeth Baker OT Occupational Therapist OT              OT Recommendation and Plan     Plan of Care Review  Plan of Care Reviewed With: patient, spouse  Progress: improving  Outcome Summary: Pt alert, O x 4, agreeable to OT tx. Pt completed bed mobility with spv with HOB elevated and bed rail used yet w/ plan to sleep in recliner at home. Pt completed sit < > stand w/ SBA, fxl mobility with CGA improved to SBA when pt decreased speed and closely monitored position of LUE in sling in turning and through doorways. Pt ascended,  descended 3 steps with SBA. SPO2  decreased to 89% one time and improved to > 90% with PLB and remained > 90%. Pt able to complete UBD over head shirt with min A largely for posterior mgmt and mgmt around nerve cath, spouse demonstrated lead with sling mgmt with need for SBA grossly for cues for mgmt, adjustment, min A for LBD (initial threading) and CGA for L axilla care for posterior A all while adhering to L shoulder precautions. OT reviewed with pt/spouse the optimal position, tech, safety awareness for ADLs, wear schedule and mgmt for sling and reiterated no showering while nerve cath still in place. Completed HEP as indicated by MD with 1 set x 10 reps in session with only min discomfort and fatigue reported post TE. Will progress pt as able while hospitalized.     Time Calculation:   Time Calculation- OT     Row Name 01/08/21 1152             Time Calculation- OT    OT Start Time  0858  -JY      OT Received On  01/08/21  -JY      OT Goal Re-Cert Due Date  01/17/21  -JY         Timed Charges    53177 - OT Therapeutic Exercise Minutes  18  -JY      93266 - OT Therapeutic Activity Minutes  18  -JY      70863 - OT Self Care/Mgmt Minutes  35  -JY        User Key  (r) = Recorded By, (t) = Taken By, (c) = Cosigned By    Initials Name Provider Type    Elizabeth Taylor OT Occupational Therapist        Therapy Charges for Today     Code Description Service Date Service Provider Modifiers Qty    21446202431 HC OT SELF CARE/MGMT/TRAIN EA 15 MIN 1/8/2021 Elizabeth Baker OT GO 3    10914176123 HC OT THERAPEUTIC ACT EA 15 MIN 1/8/2021 Elizabeth Baker OT GO 1    49495933892 HC OT THER PROC EA 15 MIN 1/8/2021 Elizabeth Baker OT GO 1               Elizabeth Baker OT  1/8/2021

## 2021-01-08 NOTE — PLAN OF CARE
Problem: Adult Inpatient Plan of Care  Goal: Plan of Care Review  Recent Flowsheet Documentation  Taken 1/8/2021 1142 by Elizabeth Baker, BREANN  Progress: improving  Plan of Care Reviewed With:   patient   spouse  Outcome Summary: Pt alert, O x 4, agreeable to OT tx. Pt completed bed mobility with spv with HOB elevated and bed rail used yet w/ plan to sleep in recliner at home. Pt completed sit < > stand w/ SBA, fxl mobility with CGA improved to SBA when pt decreased speed and closely monitored position of LUE in sling in turning and through doorways. Pt ascended, descended 3 steps with SBA. SPO2  decreased to 89% one time and improved to > 90% with PLB and remained > 90%. Pt able to complete UBD over head shirt with min A largely for posterior mgmt and mgmt around nerve cath, spouse demonstrated lead with sling mgmt with need for SBA grossly for cues for mgmt, adjustment, min A for LBD (initial threading) and CGA for L axilla care for posterior A all while adhering to L shoulder precautions. OT reviewed with pt/spouse the optimal position, tech, safety awareness for ADLs, wear schedule and mgmt for sling and reiterated no showering while nerve cath still in place. Completed HEP as indicated by MD with 1 set x 10 reps in session with only min discomfort and fatigue reported post TE. Will progress pt as able while hospitalized.

## 2021-01-08 NOTE — PLAN OF CARE
Goal Outcome Evaluation:  Plan of Care Reviewed With: patient  Progress: improving  Outcome Summary: VSS, on RA. CPAP on at night. pain controlled with PRN meds. voiding well. arrow @6ml/hr. dressing is CDI.

## 2021-01-10 NOTE — PROGRESS NOTES
JU[facility] Kinc[facility]    Nerve Cath Post Op Call    Patient Name: Stanley Bethea  :  1955  MRN:  [id number]  Date of Discharge: 2021    Nerve Cath Post Op Call:    Catheter Plan:Patient called, No answer. Message left to call [facility] pain service for any questions or complaints  Patient/Family instructed to call ON CALL anesthesia provider for any questions or problems.  Patient Follow Up:

## 2021-01-11 NOTE — PROGRESS NOTES
JUHI Kincaid    Nerve Cath Post Op Call    Patient Name: Stanley Bethea  :  1955  MRN:  [id number]  Date of Discharge: 2021    Nerve Cath Post Op Call:    Catheter Plan:Patient called, No answer. Message left to call [facility] pain service for any questions or complaints  Patient/Family instructed to call ON CALL anesthesia provider for any questions or problems.  Patient Follow Up:          Messages left, no return calls. Will remove from the list

## 2024-12-05 ENCOUNTER — HOSPITAL ENCOUNTER (OUTPATIENT)
Age: 69
Discharge: HOME | End: 2024-12-05
Payer: MEDICARE

## 2024-12-05 VITALS
SYSTOLIC BLOOD PRESSURE: 131 MMHG | HEART RATE: 56 BPM | RESPIRATION RATE: 16 BRPM | OXYGEN SATURATION: 95 % | DIASTOLIC BLOOD PRESSURE: 73 MMHG

## 2024-12-05 VITALS
HEART RATE: 59 BPM | RESPIRATION RATE: 16 BRPM | SYSTOLIC BLOOD PRESSURE: 150 MMHG | DIASTOLIC BLOOD PRESSURE: 90 MMHG | TEMPERATURE: 98.3 F | OXYGEN SATURATION: 97 %

## 2024-12-05 VITALS
DIASTOLIC BLOOD PRESSURE: 81 MMHG | HEART RATE: 75 BPM | SYSTOLIC BLOOD PRESSURE: 127 MMHG | OXYGEN SATURATION: 96 % | RESPIRATION RATE: 16 BRPM

## 2024-12-05 VITALS — OXYGEN SATURATION: 100 %

## 2024-12-05 VITALS
TEMPERATURE: 97.4 F | HEART RATE: 59 BPM | SYSTOLIC BLOOD PRESSURE: 120 MMHG | RESPIRATION RATE: 16 BRPM | OXYGEN SATURATION: 93 % | DIASTOLIC BLOOD PRESSURE: 84 MMHG

## 2024-12-05 VITALS — BODY MASS INDEX: 35.5 KG/M2

## 2024-12-05 DIAGNOSIS — K64.8: ICD-10-CM

## 2024-12-05 DIAGNOSIS — Z86.0101: ICD-10-CM

## 2024-12-05 DIAGNOSIS — K57.30: Primary | ICD-10-CM

## 2024-12-05 PROCEDURE — 45378 DIAGNOSTIC COLONOSCOPY: CPT

## 2024-12-05 NOTE — P.HP_ITS
History of Present Illness    
*Admission Date: 12/05/24    
*Reason for visit:: Personal history of adenomatous polyps    
*History of present illness:     
Mr. Mcconnell is a 69-year-old gentleman who is here for surveillance colonoscopy   
secondary to a personal history of adenomatous colon polyps.  The examination is  
deemed medically necessary for colonoscopy.  The patient has been seen,   
interviewed and examined prior to the procedure by both myself and the   
anesthesia provider.    
    
[facility]    
Disclaimer:     
The information contained in this section may have been updated after the   
patient was seen, as this information can be updated by other users.    
    
Medical History (Updated 12/05/24 @ 10:34 by Dayo Veras II, MD)    
    
GERD (gastroesophageal reflux disease)    
    
    
Surgical History (Reviewed 12/05/24 @ 09:54 by Zuly Farnsworth RN)    
    
History of bariatric surgery    
History of left shoulder replacement    
History of repair of left rotator cuff    
History of repair of right rotator cuff    
History of total right hip replacement    
    
    
Family History (Updated 12/05/24 @ 09:54 by Zuly Farnsworth RN)    
Other   Diabetes    
   Heart disease    
    
    
    
Social History (Updated 12/05/24 @ 10:12 by Ramon Loving CRNA)    
Smoking Status:  Never smoker     
alcohol intake:  never     
substance use type:  denies use     
current occupational status:  retired     
Travel in the last 8 weeks:  None     
caffeine:  Yes     
    
    
    
Review of Systems    
Review of Systems    
Review of systems (narrative):     
Negative    
*Cardiovascular    
Comments:     
Negative    
*Gastrointestinal    
Comments:     
Negative    
*Genitourinary    
Comments:     
Negative    
*Musculoskeletal    
Comments:     
Negative    
*Neurologic    
Comments:     
Negative    
    
Meds    
Home Medications and Allergies    
                                Home Medications    
    
    
    
?Medication ?Instructions ?Recorded ?Confirmed ?Type    
     
sodium,potassium,mag sulfates 17.5 See Rx Instructions PO .COMPLEX 11/22/24  Rx    
    
gram-3.13 gram-1.6 gram oral soln #354 mL       
    
(Suprep Bowel Prep Kit)        
     
omeprazole 20 mg capsule,delayed 20 mg PO BID 12/03/24 12/05/24 History    
    
release        
    
    
                           New Prescriptions to Start    
    
Prescriptions:      
    
    
                                    Allergies    
    
    
    
Allergy/AdvReac Type Severity Reaction Status Date / Time    
     
No Known Allergies Allergy   Verified 12/05/24 09:51    
    
    
    
    
Exam    
Data for Last 24 hours    
Vital signs and Labs for Last 24 Hours:     
    
                                            
    
    
    
Temp Pulse Resp BP Pulse Ox O2 Del Method O2 Flow Rate    
     
 98.3 F   59 L  16   150/90 H  97   Nasal Cannula  5     
     
 12/05/24 09:54  12/05/24 09:54  12/05/24 09:54  12/05/24 09:54  12/05/24 09:54   
12/05/24 10:22  12/05/24 10:22    
    
    
    
    
I & O for Last 24 hours:     
    
                                 Intake & Output    
    
    
    
 12/02/24 12/03/24 12/04/24 12/05/24    
    
 23:59 23:59 23:59 23:59    
     
Weight  220 lb      
    
    
    
    
*Routine HEENT Exam    
Head: Present normocephalic    
Eye: Present EOMI and PERRL    
ENT: Present mucous membranes moist    
*Routine Neck Exam    
Neck: Present supple    
*Routine Respiratory Exam    
Respiratory: Present CTA bilaterally    
*Routine Cardiovascular Exam    
Cardiovascular: Present RRR    
*Routine Abdominal Exam    
Abdominal: Present soft and normoactive bowel sounds; Absent tenderness    
*Routine Rectal Exam    
Rectal:: deferred    
*Routine Genitalia Exam    
Genitalia:: deferred    
*Routine Extremities Exam    
Extremities: Absent cyanosis, clubbing or edema    
*Routine Skin Exam    
Skin: Present warm; Absent rash    
*Routine Neurological Exam    
Neurological: Present alert and oriented X3    
    
Assessment and Plan    
*Assessment and plan    
(1) Personal history of adenomatous and serrated colon polyps:     
      Status: Acute    
      Category: Medical    
      Code(s):    
Z86.0101 - Personal history of adenomatous and serrated colon polyps    
    
Plan    
A/P: 1.  Personal history of adenomatous colon polyps is the preprocedural   
diagnosis.  The patient will be anesthetized/sedated using MAC sedation.  The   
patient has been seen and examined.  Cardiac and lung assessment prior to the   
examination is stable.  Proceed with planned colonoscopy

## 2024-12-05 NOTE — EXP.ANES.CKL
[facility]
Disclaimer: 
The information contained in this section may have been updated after the patient was seen, as this information can be updated by other users.

Medical History (Reviewed 24 @ 09:54 by Zuly Farnsworth RN)

GERD (gastroesophageal reflux disease)


Surgical History (Reviewed 24 @ 09:54 by Zuly Farnsworth RN)

History of bariatric surgery
History of left shoulder replacement
History of repair of left rotator cuff
History of repair of right rotator cuff
History of total right hip replacement


Family History (Updated 24 @ 09:54 by Zuly Farnsworth RN)
Other
 Diabetes
 Heart disease



Social History (Updated 24 @ 09:56 by Zuly Farnsworth RN)
Smoking Status:  Never smoker 
alcohol intake:  never 
substance use type:  denies use 
current occupational status:  retired 
Travel in the last 8 weeks:  None 
caffeine:  Yes 



[facility] Anesthesia Checklist
Patient Identification
Patient Identification: Verbal (Name & )
Structural Data
Admitted From: Home
Planned Operative Procedure/s: colonoscopy
Consent for Planned Operative Procedure(s) Verified: Yes
NPO Status Verified
Time NPO: 00:00
Airway Assessment
Mallampati Score:: Class II
C-Spine Mobility Assessed: Yes
TMJ Mobility Assessed: Yes
Neurological Assessment
Level of Consciousness: Awake, Alert and Appropriate
Anesthesia Plan
Anesthesia Risk discussed: Yes
Anesthesia Plan: Verified
ASA Class: II
Anesthesia Type: MAC

## 2024-12-05 NOTE — HMH.PROCNOTE
[facility] Procedure Note
Date: 12/05/24
Time: 10:49
Procedure Note:: 
Colonoscopy Procedure Report: Colonoscopy

Endoscopist: Dayo Veras II, MD

Referring physician: Tino Bobby MD

Date of Procedure: December 5, 2025

Equipment: Olympus 190 variable stiffness pediatric colonoscope

Sedation: MAC sedation

Indication: Mr. Mcconnell is a 69-year-old gentleman who is here for follow-up surveillance colonoscopy.  His last colonoscopy was 5 years ago and he had a couple of benign polyps (adenomas) removed at that time.  He reports no abdominal pain, weight 
loss, change in his bowel habits or rectal bleeding.  He reports no family history of colon cancer.

Procedure: 
Prior to the procedure, a history and physical exam was performed, and patient's medications and allergies were reviewed.  The risks, benefits and alternatives of the sedation and procedure were discussed with the patient.  All questions were 
answered and informed consent was obtained.  The patient was brought to the procedure room.  Patient identification and proposed procedure were verified by the physician and the nurse.  The patient was placed in a left lateral decubitus position and 
the scope was passed under direct vision.  Throughout the procedure, the patient's blood pressure, pulse, and oxygen saturations were monitored continuously.  The colonoscopy was accomplished without difficulty.  The patient tolerated the procedure 
well.

Findings: On digital rectal examination there was normal rectal tone. There were no external hemorrhoids.  The prostate was 2+, smooth, soft, symmetric without nodules.  The colonoscope was introduced through the anal canal to the rectum and 
advanced to the cecum. The ileocecal valve and appendiceal orifice were identified. The scope was advanced a short distance into the ileum which appeared grossly normal. The scope was then withdrawn into the colon. The cecum, ascending and 
transverse colon and mucosa were grossly normal. There were scattered diverticuli throughout the descending and sigmoid colon (LEFT colon). The rectum itself was normal. Upon retroflexion within the rectum there were grade 1-2 internal hemorrhoids. 
The preparation was excellent throughout with [place] Preparation Score of 9.  The cecal time was 10 minutes.

Impression: 
1.  Left-sided diverticulosis
2.  Grade 1-2 internal hemorrhoids

Plan:
The patient will not require screening/surveillance colonoscopy again for 10 years.  We will discuss whether further surveillance is warranted.

I would encourage bulking psyllium fiber supplementation on a long-term daily maintenance basis.

## 2024-12-05 NOTE — EXP.HP
History of Present Illness
*Admission Date: 12/05/24
*Reason for visit:: Personal history of adenomatous polyps
*History of present illness: 
Mr. Mcconnell is a 69-year-old gentleman who is here for surveillance colonoscopy secondary to a personal history of adenomatous colon polyps.  The examination is deemed medically necessary for colonoscopy.  The patient has been seen, interviewed and 
examined prior to the procedure by both myself and the anesthesia provider.

[facility]
Disclaimer: 
The information contained in this section may have been updated after the patient was seen, as this information can be updated by other users.

Medical History (Updated 12/05/24 @ 10:34 by Dayo Veras II, MD)

GERD (gastroesophageal reflux disease)


Surgical History (Reviewed 12/05/24 @ 09:54 by Zuly Farnsworth RN)

History of bariatric surgery
History of left shoulder replacement
History of repair of left rotator cuff
History of repair of right rotator cuff
History of total right hip replacement


Family History (Updated 12/05/24 @ 09:54 by Zuly Farnsworth RN)
Other
 Diabetes
 Heart disease



Social History (Updated 12/05/24 @ 10:12 by Ramon Loving CRNA)
Smoking Status:  Never smoker 
alcohol intake:  never 
substance use type:  denies use 
current occupational status:  retired 
Travel in the last 8 weeks:  None 
caffeine:  Yes 



Review of Systems
Review of Systems
Review of systems (narrative): 
Negative
*Cardiovascular
Comments: 
Negative
*Gastrointestinal
Comments: 
Negative
*Genitourinary
Comments: 
Negative
*Musculoskeletal
Comments: 
Negative
*Neurologic
Comments: 
Negative

Meds
Home Medications and Allergies
Home Medications

?Medication ?Instructions ?Recorded ?Confirmed ?Type
sodium,potassium,mag sulfates 17.5 See Rx Instructions PO .COMPLEX 11/22/24  Rx
gram-3.13 gram-1.6 gram oral soln #354 mL   
(Suprep Bowel Prep Kit)    
omeprazole 20 mg capsule,delayed 20 mg PO BID 12/03/24 12/05/24 History
release    

New Prescriptions to Start

Prescriptions:  


Allergies

Allergy/AdvReac Type Severity Reaction Status Date / Time
No Known Allergies Allergy   Verified 12/05/24 09:51



Exam
Data for Last 24 hours
Vital signs and Labs for Last 24 Hours: 



Temp Pulse Resp BP Pulse Ox O2 Del Method O2 Flow Rate
 98.3 F   59 L  16   150/90 H  97   Nasal Cannula  5 
 12/05/24 09:54  12/05/24 09:54  12/05/24 09:54  12/05/24 09:54  12/05/24 09:54  12/05/24 10:22  12/05/24 10:22



I & O for Last 24 hours: 

Intake & Output

 12/02/24 12/03/24 12/04/24 12/05/24
 23:59 23:59 23:59 23:59
Weight  220 lb  



*Routine HEENT Exam
Head: Present normocephalic
Eye: Present EOMI and PERRL
ENT: Present mucous membranes moist
*Routine Neck Exam
Neck: Present supple
*Routine Respiratory Exam
Respiratory: Present CTA bilaterally
*Routine Cardiovascular Exam
Cardiovascular: Present RRR
*Routine Abdominal Exam
Abdominal: Present soft and normoactive bowel sounds; Absent tenderness
*Routine Rectal Exam
Rectal:: deferred
*Routine Genitalia Exam
Genitalia:: deferred
*Routine Extremities Exam
Extremities: Absent cyanosis, clubbing or edema
*Routine Skin Exam
Skin: Present warm; Absent rash
*Routine Neurological Exam
Neurological: Present alert and oriented X3

Assessment and Plan
*Assessment and plan
(1) Personal history of adenomatous and serrated colon polyps: 
      Status: Acute
      Category: Medical
      Code(s):
Z86.0101 - Personal history of adenomatous and serrated colon polyps

Plan
A/P: 1.  Personal history of adenomatous colon polyps is the preprocedural diagnosis.  The patient will be anesthetized/sedated using MAC sedation.  The patient has been seen and examined.  Cardiac and lung assessment prior to the examination is 
stable.  Proceed with planned colonoscopy

## 2024-12-05 NOTE — P.PCN_ITS
[facility] Procedure Note    
Date: 12/05/24    
Time: 10:49    
Procedure Note::     
Colonoscopy Procedure Report: Colonoscopy    
    
Endoscopist: Dayo Veras II, MD    
    
Referring physician: Tino Bobby MD    
    
Date of Procedure: December 5, 2025    
    
Equipment: Olympus 190 variable stiffness pediatric colonoscope    
    
Sedation: MAC sedation    
    
Indication: Mr. Mcconnell is a 69-year-old gentleman who is here for follow-up   
surveillance colonoscopy.  His last colonoscopy was 5 years ago and he had a   
couple of benign polyps (adenomas) removed at that time.  He reports no   
abdominal pain, weight loss, change in his bowel habits or rectal bleeding.  He   
reports no family history of colon cancer.    
    
Procedure:     
Prior to the procedure, a history and physical exam was performed, and patient's  
medications and allergies were reviewed.  The risks, benefits and alternatives   
of the sedation and procedure were discussed with the patient.  All questions   
were answered and informed consent was obtained.  The patient was brought to the  
procedure room.  Patient identification and proposed procedure were verified by   
the physician and the nurse.  The patient was placed in a left lateral decubitus  
position and the scope was passed under direct vision.  Throughout the   
procedure, the patient's blood pressure, pulse, and oxygen saturations were mo  
nitored continuously.  The colonoscopy was accomplished without difficulty.  The  
patient tolerated the procedure well.    
    
Findings: On digital rectal examination there was normal rectal tone. There were  
no external hemorrhoids.  The prostate was 2+, smooth, soft, symmetric without   
nodules.  The colonoscope was introduced through the anal canal to the rectum   
and advanced to the cecum. The ileocecal valve and appendiceal orifice were   
identified. The scope was advanced a short distance into the ileum which   
appeared grossly normal. The scope was then withdrawn into the colon. The cecum,  
ascending and transverse colon and mucosa were grossly normal. There were   
scattered diverticuli throughout the descending and sigmoid colon (LEFT colon).   
The rectum itself was normal. Upon retroflexion within the rectum there were   
grade 1-2 internal hemorrhoids. The preparation was excellent throughout with   
[place] Preparation Score of 9.  The cecal time was 10 minutes.    
    
Impression:     
1.  Left-sided diverticulosis    
2.  Grade 1-2 internal hemorrhoids    
    
Plan:    
The patient will not require screening/surveillance colonoscopy again for 10   
years.  We will discuss whether further surveillance is warranted.    
    
I would encourage bulking psyllium fiber supplementation on a long-term daily   
maintenance basis.

## 2024-12-05 NOTE — P.PNANES_ITS
[facility]    
Disclaimer:     
The information contained in this section may have been updated after the   
patient was seen, as this information can be updated by other users.    
    
Medical History (Reviewed 24 @ 09:54 by Zuly Farnsworth RN)    
    
GERD (gastroesophageal reflux disease)    
    
    
Surgical History (Reviewed 24 @ 09:54 by Zuly Farnsworth RN)    
    
History of bariatric surgery    
History of left shoulder replacement    
History of repair of left rotator cuff    
History of repair of right rotator cuff    
History of total right hip replacement    
    
    
Family History (Updated 24 @ 09:54 by Zuly Farnsworth RN)    
Other   Diabetes    
   Heart disease    
    
    
    
Social History (Updated 24 @ 09:56 by Zuly Farnsworth RN)    
Smoking Status:  Never smoker     
alcohol intake:  never     
substance use type:  denies use     
current occupational status:  retired     
Travel in the last 8 weeks:  None     
caffeine:  Yes     
    
    
    
[facility] Anesthesia Checklist    
Patient Identification    
Patient Identification: Verbal (Name & )    
Structural Data    
Admitted From: Home    
Planned Operative Procedure/s: colonoscopy    
Consent for Planned Operative Procedure(s) Verified: Yes    
NPO Status Verified    
Time NPO: 00:00    
Airway Assessment    
Mallampati Score:: Class II    
C-Spine Mobility Assessed: Yes    
TMJ Mobility Assessed: Yes    
Neurological Assessment    
Level of Consciousness: Awake, Alert and Appropriate    
Anesthesia Plan    
Anesthesia Risk discussed: Yes    
Anesthesia Plan: Verified    
ASA Class: II    
Anesthesia Type: MAC

## (undated) DEVICE — GLV SURG SENSICARE PI MIC PF SZ7.5 LF STRL

## (undated) DEVICE — UNDERGLV SURG BIOGEL INDICAT PI SZ8 BLU

## (undated) DEVICE — NDL REV W/NITNL LP C13 .5CIR 36.6MM

## (undated) DEVICE — 3M™ IOBAN™ 2 ANTIMICROBIAL INCISE DRAPE 6651EZ: Brand: IOBAN™ 2

## (undated) DEVICE — 3M™ STERI-DRAPE™ U-DRAPE 1015: Brand: STERI-DRAPE™

## (undated) DEVICE — UNDERGLV SURG BIOGEL INDICAT PF 61/2 GRN

## (undated) DEVICE — SLNG ULTRSLING3 13TO15IN LG

## (undated) DEVICE — ST PIN FIX TEMP UNIVERSREVERS W/BRKAWAY/GUIDE/PIN 2.4MM STRL

## (undated) DEVICE — T-MAX DISPOSABLE FACE MASK 8 PER BOX

## (undated) DEVICE — PK MAJ SHLDR SPLT 10

## (undated) DEVICE — GLV SURG PREMIERPRO MIC LTX PF SZ6.5 BRN

## (undated) DEVICE — BIT DRL UNIVERS REVERS MODULAR/GLEN 3MM STRL

## (undated) DEVICE — INTENDED FOR TISSUE SEPARATION, AND OTHER PROCEDURES THAT REQUIRE A SHARP SURGICAL BLADE TO PUNCTURE OR CUT.: Brand: BARD-PARKER ® CARBON RIB-BACK BLADES

## (undated) DEVICE — CVR HNDL LIGHT RIGID

## (undated) DEVICE — SUT VIC 2/0 CT2 27IN J269H

## (undated) DEVICE — DRAPE,REIN 53X77,STERILE: Brand: MEDLINE

## (undated) DEVICE — ANTIBACTERIAL UNDYED BRAIDED (POLYGLACTIN 910), SYNTHETIC ABSORBABLE SUTURE: Brand: COATED VICRYL

## (undated) DEVICE — PUMP PAIN AUTOFUSER AUTO SELCT NOBOLUS 1TO14ML/HR 550ML DISP

## (undated) DEVICE — PROXIMATE RH ROTATING HEAD SKIN STAPLERS (35 WIDE) CONTAINS 35 STAINLESS STEEL STAPLES: Brand: PROXIMATE

## (undated) DEVICE — 1010 S-DRAPE TOWEL DRAPE 10/BX: Brand: STERI-DRAPE™

## (undated) DEVICE — SPNG GZ WOVN 4X4IN 12PLY 10/BX STRL

## (undated) DEVICE — 450 ML BOTTLE OF 0.05% CHLORHEXIDINE GLUCONATE IN 99.95% STERILE WATER FOR IRRIGATION, USP AND APPLICATOR.: Brand: IRRISEPT ANTIMICROBIAL WOUND LAVAGE

## (undated) DEVICE — GLV SURG SENSICARE PI ORTHO SZ7.5 LF STRL

## (undated) DEVICE — SWABSTK SKINPREP PVPI PRE/SAT 8IN STRL

## (undated) DEVICE — ISO/ALC 70PCT 16OZ